# Patient Record
Sex: FEMALE | Race: WHITE | NOT HISPANIC OR LATINO
[De-identification: names, ages, dates, MRNs, and addresses within clinical notes are randomized per-mention and may not be internally consistent; named-entity substitution may affect disease eponyms.]

---

## 2018-04-16 ENCOUNTER — APPOINTMENT (OUTPATIENT)
Dept: NEUROSURGERY | Facility: CLINIC | Age: 53
End: 2018-04-16
Payer: COMMERCIAL

## 2018-04-16 PROCEDURE — 99203 OFFICE O/P NEW LOW 30 MIN: CPT

## 2018-10-31 ENCOUNTER — RESULT REVIEW (OUTPATIENT)
Age: 53
End: 2018-10-31

## 2019-03-30 ENCOUNTER — EMERGENCY (EMERGENCY)
Facility: HOSPITAL | Age: 54
LOS: 0 days | Discharge: HOME | End: 2019-03-30
Attending: EMERGENCY MEDICINE | Admitting: EMERGENCY MEDICINE

## 2019-03-30 VITALS
HEART RATE: 77 BPM | RESPIRATION RATE: 20 BRPM | SYSTOLIC BLOOD PRESSURE: 161 MMHG | DIASTOLIC BLOOD PRESSURE: 75 MMHG | OXYGEN SATURATION: 99 % | TEMPERATURE: 96 F

## 2019-03-30 VITALS
SYSTOLIC BLOOD PRESSURE: 159 MMHG | TEMPERATURE: 97 F | OXYGEN SATURATION: 99 % | DIASTOLIC BLOOD PRESSURE: 72 MMHG | HEART RATE: 76 BPM | RESPIRATION RATE: 20 BRPM

## 2019-03-30 DIAGNOSIS — R07.81 PLEURODYNIA: ICD-10-CM

## 2019-03-30 DIAGNOSIS — R07.89 OTHER CHEST PAIN: ICD-10-CM

## 2019-03-30 DIAGNOSIS — Z87.442 PERSONAL HISTORY OF URINARY CALCULI: ICD-10-CM

## 2019-03-30 LAB
ALBUMIN SERPL ELPH-MCNC: 4.4 G/DL — SIGNIFICANT CHANGE UP (ref 3.5–5.2)
ALP SERPL-CCNC: 95 U/L — SIGNIFICANT CHANGE UP (ref 30–115)
ALT FLD-CCNC: 18 U/L — SIGNIFICANT CHANGE UP (ref 0–41)
ANION GAP SERPL CALC-SCNC: 14 MMOL/L — SIGNIFICANT CHANGE UP (ref 7–14)
AST SERPL-CCNC: 21 U/L — SIGNIFICANT CHANGE UP (ref 0–41)
BASOPHILS # BLD AUTO: 0.04 K/UL — SIGNIFICANT CHANGE UP (ref 0–0.2)
BASOPHILS NFR BLD AUTO: 0.4 % — SIGNIFICANT CHANGE UP (ref 0–1)
BILIRUB SERPL-MCNC: 0.2 MG/DL — SIGNIFICANT CHANGE UP (ref 0.2–1.2)
BUN SERPL-MCNC: 14 MG/DL — SIGNIFICANT CHANGE UP (ref 10–20)
CALCIUM SERPL-MCNC: 10.1 MG/DL — SIGNIFICANT CHANGE UP (ref 8.5–10.1)
CHLORIDE SERPL-SCNC: 105 MMOL/L — SIGNIFICANT CHANGE UP (ref 98–110)
CK SERPL-CCNC: 74 U/L — SIGNIFICANT CHANGE UP (ref 0–225)
CO2 SERPL-SCNC: 23 MMOL/L — SIGNIFICANT CHANGE UP (ref 17–32)
CREAT SERPL-MCNC: 0.6 MG/DL — LOW (ref 0.7–1.5)
D DIMER BLD IA.RAPID-MCNC: 166 NG/ML DDU — SIGNIFICANT CHANGE UP (ref 0–230)
EOSINOPHIL # BLD AUTO: 0.13 K/UL — SIGNIFICANT CHANGE UP (ref 0–0.7)
EOSINOPHIL NFR BLD AUTO: 1.3 % — SIGNIFICANT CHANGE UP (ref 0–8)
GLUCOSE SERPL-MCNC: 104 MG/DL — HIGH (ref 70–99)
HCT VFR BLD CALC: 39.6 % — SIGNIFICANT CHANGE UP (ref 37–47)
HGB BLD-MCNC: 12.4 G/DL — SIGNIFICANT CHANGE UP (ref 12–16)
IMM GRANULOCYTES NFR BLD AUTO: 0.2 % — SIGNIFICANT CHANGE UP (ref 0.1–0.3)
LYMPHOCYTES # BLD AUTO: 3.33 K/UL — SIGNIFICANT CHANGE UP (ref 1.2–3.4)
LYMPHOCYTES # BLD AUTO: 33.8 % — SIGNIFICANT CHANGE UP (ref 20.5–51.1)
MCHC RBC-ENTMCNC: 25.2 PG — LOW (ref 27–31)
MCHC RBC-ENTMCNC: 31.3 G/DL — LOW (ref 32–37)
MCV RBC AUTO: 80.3 FL — LOW (ref 81–99)
MONOCYTES # BLD AUTO: 1.04 K/UL — HIGH (ref 0.1–0.6)
MONOCYTES NFR BLD AUTO: 10.5 % — HIGH (ref 1.7–9.3)
NEUTROPHILS # BLD AUTO: 5.3 K/UL — SIGNIFICANT CHANGE UP (ref 1.4–6.5)
NEUTROPHILS NFR BLD AUTO: 53.8 % — SIGNIFICANT CHANGE UP (ref 42.2–75.2)
NRBC # BLD: 0 /100 WBCS — SIGNIFICANT CHANGE UP (ref 0–0)
PLATELET # BLD AUTO: 216 K/UL — SIGNIFICANT CHANGE UP (ref 130–400)
POTASSIUM SERPL-MCNC: 4.1 MMOL/L — SIGNIFICANT CHANGE UP (ref 3.5–5)
POTASSIUM SERPL-SCNC: 4.1 MMOL/L — SIGNIFICANT CHANGE UP (ref 3.5–5)
PROT SERPL-MCNC: 7.1 G/DL — SIGNIFICANT CHANGE UP (ref 6–8)
RBC # BLD: 4.93 M/UL — SIGNIFICANT CHANGE UP (ref 4.2–5.4)
RBC # FLD: 16 % — HIGH (ref 11.5–14.5)
SODIUM SERPL-SCNC: 142 MMOL/L — SIGNIFICANT CHANGE UP (ref 135–146)
TROPONIN T SERPL-MCNC: <0.01 NG/ML — SIGNIFICANT CHANGE UP
WBC # BLD: 9.86 K/UL — SIGNIFICANT CHANGE UP (ref 4.8–10.8)
WBC # FLD AUTO: 9.86 K/UL — SIGNIFICANT CHANGE UP (ref 4.8–10.8)

## 2019-03-30 RX ORDER — ASPIRIN/CALCIUM CARB/MAGNESIUM 324 MG
325 TABLET ORAL ONCE
Qty: 0 | Refills: 0 | Status: COMPLETED | OUTPATIENT
Start: 2019-03-30 | End: 2019-03-30

## 2019-03-30 RX ADMIN — Medication 325 MILLIGRAM(S): at 20:20

## 2019-03-30 NOTE — ED PROVIDER NOTE - PROGRESS NOTE DETAILS
labs and imaging discussed with pt, recommend obs for r/o acs.  Pt notes she had negative exercise stress test one month ago. D/w pt that with active chest pain need further workup, she will d/w with her  regarding staying vs. ama pt notes she wants to sign out ama, aware of risks of leaving including worsening morbidity and mortality including death.   at bedside.  Labs printed and given to pt. To follow up with Luis on Monday, aware to return to ED for any concerns or if she changes her mind

## 2019-03-30 NOTE — ED ADULT TRIAGE NOTE - CHIEF COMPLAINT QUOTE
I woke up this morning with pain here (right anterior thorax) and now the pain is going up (neck) - patient

## 2019-03-30 NOTE — ED PROVIDER NOTE - OBJECTIVE STATEMENT
52 yo female h/o Lupus, on cellcept, h/o renal colic with lithotripsy one month ago complicated by abdominal hematoma, p/w right sided chest pain since this am. Notes that she awoke with the pain to right chest and then at dinner time radiated to right neck. Constant and pressure like.  +pleuritic. Alleviated a bit by tylenol. Pt denies trauma. Denies similar symptoms in past. No fhx of early cardiac disease.  Denies leg pain or swelling.  No recent sick contacts or travel.  PMD is Luis.

## 2019-03-30 NOTE — ED PROVIDER NOTE - NS ED ROS FT
Review of Systems    Constitutional: (-) fever  Cardiovascular: , (-) syncope  Respiratory: (-) cough, (-) shortness of breath  Gastrointestinal: (-) vomiting, (-) diarrhea, (-) abdominal pain  Musculoskeletal:  (-) back pain, (-) joint pain  Integumentary: (-) rash, (-) edema  Neurological: (-) headache, (-) altered mental status    Except as documented in the HPI, all other systems are negative.

## 2019-03-30 NOTE — ED PROVIDER NOTE - CLINICAL SUMMARY MEDICAL DECISION MAKING FREE TEXT BOX
52 yo female with sle p/w right sided cp to right neck. EKG noted. Labs noted. D/w pt and  obs for further eval, treatment, pt wants to leave ama, risks discussed, pt to follow up, return if changes her mind.

## 2019-03-30 NOTE — ED ADULT NURSE NOTE - OBJECTIVE STATEMENT
i'm having right side chest pains when I take a deep breath.  I think it might be my lupus but I'm not sure

## 2019-03-30 NOTE — ED PROVIDER NOTE - CARE PROVIDER_API CALL
Alonzo Solis)  Family Medicine  [address] [place] [address]  Phone: [phone]  Fax: [phone]  Follow Up Time: 1-3 Days

## 2019-03-30 NOTE — ED PROVIDER NOTE - NSFOLLOWUPINSTRUCTIONS_ED_ALL_ED_FT
you are having chest pain. We did not get to finish your evaluation and treatment as you are leaving AMA. Please follow up with your doctor in 24 hours, return for any concerns or if you change your mind.

## 2019-05-09 PROBLEM — M32.9 SYSTEMIC LUPUS ERYTHEMATOSUS, UNSPECIFIED: Chronic | Status: ACTIVE | Noted: 2019-03-30

## 2019-05-28 ENCOUNTER — OUTPATIENT (OUTPATIENT)
Dept: OUTPATIENT SERVICES | Facility: HOSPITAL | Age: 54
LOS: 1 days | Discharge: HOME | End: 2019-05-28

## 2019-05-28 VITALS
TEMPERATURE: 98 F | OXYGEN SATURATION: 99 % | WEIGHT: 160.94 LBS | HEIGHT: 62 IN | DIASTOLIC BLOOD PRESSURE: 84 MMHG | SYSTOLIC BLOOD PRESSURE: 144 MMHG | HEART RATE: 63 BPM | RESPIRATION RATE: 18 BRPM

## 2019-05-28 VITALS — HEART RATE: 60 BPM | RESPIRATION RATE: 18 BRPM | SYSTOLIC BLOOD PRESSURE: 150 MMHG | DIASTOLIC BLOOD PRESSURE: 70 MMHG

## 2019-05-28 DIAGNOSIS — J38.1 POLYP OF VOCAL CORD AND LARYNX: Chronic | ICD-10-CM

## 2019-05-28 DIAGNOSIS — Z98.890 OTHER SPECIFIED POSTPROCEDURAL STATES: Chronic | ICD-10-CM

## 2019-06-01 DIAGNOSIS — I73.00 RAYNAUD'S SYNDROME WITHOUT GANGRENE: ICD-10-CM

## 2019-06-01 DIAGNOSIS — M19.90 UNSPECIFIED OSTEOARTHRITIS, UNSPECIFIED SITE: ICD-10-CM

## 2019-06-01 DIAGNOSIS — H26.9 UNSPECIFIED CATARACT: ICD-10-CM

## 2019-06-01 DIAGNOSIS — L93.2 OTHER LOCAL LUPUS ERYTHEMATOSUS: ICD-10-CM

## 2019-06-01 DIAGNOSIS — N18.9 CHRONIC KIDNEY DISEASE, UNSPECIFIED: ICD-10-CM

## 2019-06-04 PROBLEM — I73.00 RAYNAUD'S SYNDROME WITHOUT GANGRENE: Chronic | Status: ACTIVE | Noted: 2019-05-28

## 2019-06-04 PROBLEM — N18.9 CHRONIC KIDNEY DISEASE, UNSPECIFIED: Chronic | Status: ACTIVE | Noted: 2019-05-28

## 2019-06-18 ENCOUNTER — OUTPATIENT (OUTPATIENT)
Dept: OUTPATIENT SERVICES | Facility: HOSPITAL | Age: 54
LOS: 1 days | Discharge: HOME | End: 2019-06-18

## 2019-06-18 VITALS — SYSTOLIC BLOOD PRESSURE: 165 MMHG | RESPIRATION RATE: 15 BRPM | DIASTOLIC BLOOD PRESSURE: 76 MMHG | HEART RATE: 68 BPM

## 2019-06-18 VITALS
HEIGHT: 64 IN | SYSTOLIC BLOOD PRESSURE: 175 MMHG | HEART RATE: 64 BPM | DIASTOLIC BLOOD PRESSURE: 79 MMHG | WEIGHT: 154.98 LBS | TEMPERATURE: 97 F | RESPIRATION RATE: 17 BRPM | OXYGEN SATURATION: 99 %

## 2019-06-18 DIAGNOSIS — J38.1 POLYP OF VOCAL CORD AND LARYNX: Chronic | ICD-10-CM

## 2019-06-18 DIAGNOSIS — Z98.890 OTHER SPECIFIED POSTPROCEDURAL STATES: Chronic | ICD-10-CM

## 2019-06-18 RX ORDER — ONDANSETRON 8 MG/1
4 TABLET, FILM COATED ORAL ONCE
Refills: 0 | Status: DISCONTINUED | OUTPATIENT
Start: 2019-06-18 | End: 2019-07-03

## 2019-06-18 RX ORDER — SODIUM CHLORIDE 9 MG/ML
1000 INJECTION, SOLUTION INTRAVENOUS
Refills: 0 | Status: DISCONTINUED | OUTPATIENT
Start: 2019-06-18 | End: 2019-07-03

## 2019-06-18 RX ORDER — ACETAMINOPHEN 500 MG
650 TABLET ORAL ONCE
Refills: 0 | Status: DISCONTINUED | OUTPATIENT
Start: 2019-06-18 | End: 2019-07-03

## 2019-06-18 NOTE — PACU DISCHARGE NOTE - COMMENTS
53 y o female S/P Right ECCE with IOL Implant, LSB/MAC without complications. VS /84 HR 65 RR 16 SaO2 99%. Pt tolerated procedure well.

## 2019-06-21 DIAGNOSIS — M32.9 SYSTEMIC LUPUS ERYTHEMATOSUS, UNSPECIFIED: ICD-10-CM

## 2019-06-21 DIAGNOSIS — H25.11 AGE-RELATED NUCLEAR CATARACT, RIGHT EYE: ICD-10-CM

## 2019-06-21 DIAGNOSIS — I73.00 RAYNAUD'S SYNDROME WITHOUT GANGRENE: ICD-10-CM

## 2019-06-21 DIAGNOSIS — M19.90 UNSPECIFIED OSTEOARTHRITIS, UNSPECIFIED SITE: ICD-10-CM

## 2019-06-21 DIAGNOSIS — Z87.442 PERSONAL HISTORY OF URINARY CALCULI: ICD-10-CM

## 2020-02-05 ENCOUNTER — RESULT REVIEW (OUTPATIENT)
Age: 55
End: 2020-02-05

## 2020-12-10 ENCOUNTER — APPOINTMENT (OUTPATIENT)
Dept: UROLOGY | Facility: CLINIC | Age: 55
End: 2020-12-10
Payer: COMMERCIAL

## 2020-12-10 PROCEDURE — 99203 OFFICE O/P NEW LOW 30 MIN: CPT

## 2020-12-10 PROCEDURE — 99072 ADDL SUPL MATRL&STAF TM PHE: CPT

## 2020-12-22 ENCOUNTER — OUTPATIENT (OUTPATIENT)
Dept: OUTPATIENT SERVICES | Facility: HOSPITAL | Age: 55
LOS: 1 days | Discharge: HOME | End: 2020-12-22
Payer: MEDICARE

## 2020-12-22 ENCOUNTER — RESULT REVIEW (OUTPATIENT)
Age: 55
End: 2020-12-22

## 2020-12-22 VITALS
HEART RATE: 65 BPM | RESPIRATION RATE: 18 BRPM | OXYGEN SATURATION: 97 % | HEIGHT: 64 IN | TEMPERATURE: 97 F | DIASTOLIC BLOOD PRESSURE: 92 MMHG | SYSTOLIC BLOOD PRESSURE: 160 MMHG | WEIGHT: 154.1 LBS

## 2020-12-22 DIAGNOSIS — Z01.818 ENCOUNTER FOR OTHER PREPROCEDURAL EXAMINATION: ICD-10-CM

## 2020-12-22 DIAGNOSIS — Z98.890 OTHER SPECIFIED POSTPROCEDURAL STATES: Chronic | ICD-10-CM

## 2020-12-22 DIAGNOSIS — N20.0 CALCULUS OF KIDNEY: ICD-10-CM

## 2020-12-22 DIAGNOSIS — J38.1 POLYP OF VOCAL CORD AND LARYNX: Chronic | ICD-10-CM

## 2020-12-22 LAB
ALBUMIN SERPL ELPH-MCNC: 4.6 G/DL — SIGNIFICANT CHANGE UP (ref 3.5–5.2)
ALP SERPL-CCNC: 77 U/L — SIGNIFICANT CHANGE UP (ref 30–115)
ALT FLD-CCNC: 16 U/L — SIGNIFICANT CHANGE UP (ref 0–41)
ANION GAP SERPL CALC-SCNC: 10 MMOL/L — SIGNIFICANT CHANGE UP (ref 7–14)
APPEARANCE UR: CLEAR — SIGNIFICANT CHANGE UP
APTT BLD: 33.8 SEC — SIGNIFICANT CHANGE UP (ref 27–39.2)
AST SERPL-CCNC: 23 U/L — SIGNIFICANT CHANGE UP (ref 0–41)
BASOPHILS # BLD AUTO: 0.04 K/UL — SIGNIFICANT CHANGE UP (ref 0–0.2)
BASOPHILS NFR BLD AUTO: 0.5 % — SIGNIFICANT CHANGE UP (ref 0–1)
BILIRUB SERPL-MCNC: 0.3 MG/DL — SIGNIFICANT CHANGE UP (ref 0.2–1.2)
BILIRUB UR-MCNC: NEGATIVE — SIGNIFICANT CHANGE UP
BUN SERPL-MCNC: 14 MG/DL — SIGNIFICANT CHANGE UP (ref 10–20)
CALCIUM SERPL-MCNC: 9.9 MG/DL — SIGNIFICANT CHANGE UP (ref 8.5–10.1)
CHLORIDE SERPL-SCNC: 104 MMOL/L — SIGNIFICANT CHANGE UP (ref 98–110)
CO2 SERPL-SCNC: 26 MMOL/L — SIGNIFICANT CHANGE UP (ref 17–32)
COLOR SPEC: SIGNIFICANT CHANGE UP
CREAT SERPL-MCNC: 1.1 MG/DL — SIGNIFICANT CHANGE UP (ref 0.7–1.5)
DIFF PNL FLD: NEGATIVE — SIGNIFICANT CHANGE UP
EOSINOPHIL # BLD AUTO: 0.12 K/UL — SIGNIFICANT CHANGE UP (ref 0–0.7)
EOSINOPHIL NFR BLD AUTO: 1.6 % — SIGNIFICANT CHANGE UP (ref 0–8)
GLUCOSE SERPL-MCNC: 92 MG/DL — SIGNIFICANT CHANGE UP (ref 70–99)
GLUCOSE UR QL: NEGATIVE — SIGNIFICANT CHANGE UP
HCT VFR BLD CALC: 44.6 % — SIGNIFICANT CHANGE UP (ref 37–47)
HGB BLD-MCNC: 14.4 G/DL — SIGNIFICANT CHANGE UP (ref 12–16)
IMM GRANULOCYTES NFR BLD AUTO: 0.3 % — SIGNIFICANT CHANGE UP (ref 0.1–0.3)
INR BLD: 0.95 RATIO — SIGNIFICANT CHANGE UP (ref 0.65–1.3)
KETONES UR-MCNC: NEGATIVE — SIGNIFICANT CHANGE UP
LEUKOCYTE ESTERASE UR-ACNC: NEGATIVE — SIGNIFICANT CHANGE UP
LYMPHOCYTES # BLD AUTO: 2.94 K/UL — SIGNIFICANT CHANGE UP (ref 1.2–3.4)
LYMPHOCYTES # BLD AUTO: 38.1 % — SIGNIFICANT CHANGE UP (ref 20.5–51.1)
MCHC RBC-ENTMCNC: 27.9 PG — SIGNIFICANT CHANGE UP (ref 27–31)
MCHC RBC-ENTMCNC: 32.3 G/DL — SIGNIFICANT CHANGE UP (ref 32–37)
MCV RBC AUTO: 86.3 FL — SIGNIFICANT CHANGE UP (ref 81–99)
MONOCYTES # BLD AUTO: 0.65 K/UL — HIGH (ref 0.1–0.6)
MONOCYTES NFR BLD AUTO: 8.4 % — SIGNIFICANT CHANGE UP (ref 1.7–9.3)
NEUTROPHILS # BLD AUTO: 3.95 K/UL — SIGNIFICANT CHANGE UP (ref 1.4–6.5)
NEUTROPHILS NFR BLD AUTO: 51.1 % — SIGNIFICANT CHANGE UP (ref 42.2–75.2)
NITRITE UR-MCNC: NEGATIVE — SIGNIFICANT CHANGE UP
NRBC # BLD: 0 /100 WBCS — SIGNIFICANT CHANGE UP (ref 0–0)
PH UR: 7 — SIGNIFICANT CHANGE UP (ref 5–8)
PLATELET # BLD AUTO: 221 K/UL — SIGNIFICANT CHANGE UP (ref 130–400)
POTASSIUM SERPL-MCNC: 4.5 MMOL/L — SIGNIFICANT CHANGE UP (ref 3.5–5)
POTASSIUM SERPL-SCNC: 4.5 MMOL/L — SIGNIFICANT CHANGE UP (ref 3.5–5)
PROT SERPL-MCNC: 7.1 G/DL — SIGNIFICANT CHANGE UP (ref 6–8)
PROT UR-MCNC: SIGNIFICANT CHANGE UP
PROTHROM AB SERPL-ACNC: 10.9 SEC — SIGNIFICANT CHANGE UP (ref 9.95–12.87)
RBC # BLD: 5.17 M/UL — SIGNIFICANT CHANGE UP (ref 4.2–5.4)
RBC # FLD: 13.2 % — SIGNIFICANT CHANGE UP (ref 11.5–14.5)
SODIUM SERPL-SCNC: 140 MMOL/L — SIGNIFICANT CHANGE UP (ref 135–146)
SP GR SPEC: 1.01 — SIGNIFICANT CHANGE UP (ref 1.01–1.03)
UROBILINOGEN FLD QL: SIGNIFICANT CHANGE UP
WBC # BLD: 7.72 K/UL — SIGNIFICANT CHANGE UP (ref 4.8–10.8)
WBC # FLD AUTO: 7.72 K/UL — SIGNIFICANT CHANGE UP (ref 4.8–10.8)

## 2020-12-22 PROCEDURE — 72050 X-RAY EXAM NECK SPINE 4/5VWS: CPT | Mod: 26

## 2020-12-22 PROCEDURE — 93010 ELECTROCARDIOGRAM REPORT: CPT

## 2020-12-22 NOTE — H&P PST ADULT - NSICDXPASTSURGICALHX_GEN_ALL_CORE_FT
PAST SURGICAL HISTORY:  H/O lithotripsy eswal    History of D&C     History of surgery renal biopsy    Laryngeal polyp

## 2020-12-22 NOTE — H&P PST ADULT - HISTORY OF PRESENT ILLNESS
hx stones since christmas 2017  noted to have large stone on left side ureter   now for planned procedure     PATIENT CURRENTLY DENIES CHEST PAIN  SHORTNESS OF BREATH  PALPITATIONS,  DYSURIA, OR UPPER RESPIRATORY INFECTION IN PAST 2 WEEKS  EXERCISE  TOLERANCE  1-2 FLIGHT OF STAIRS  WITHOUT SHORTNESS OF BREATH  denies travel outside the [place] in the past 30 days  pt denies any covid s/s, or tested positive in the past  pt advised self quarantine till day of procedure  Anesthesia Alert  NO--Difficult Airway  NO--History of neck surgery or radiation  NO--Limited ROM of neck  NO--History of Malignant hyperthermia  NO--No personal or family history of Pseudocholinesterase deficiency.  Prior Anesthesia Complication  hypotension   NO--Latex Allergy  NO--Loose teeth  + Rheumatoid Arthritis  NO--CLAUDIA  + Other_____  Raynaud's disease     Encounter for other preprocedural examination    Calculus of kidney    ^N20.0/ 18669/ 95038                                                           1/13/21 [facility]    Encounter for other preprocedural examination    Calculus of kidney    Raynauds disease    Chronic kidney disease (CKD)    Arthritis    Lupus    History of surgery    Laryngeal polyp    H/O lithotripsy    History of D&amp;C

## 2020-12-22 NOTE — H&P PST ADULT - NSICDXPASTMEDICALHX_GEN_ALL_CORE_FT
PAST MEDICAL HISTORY:  Arthritis     Chronic kidney disease (CKD)     Lupus     Raynauds disease     Rheumatoid arthritis

## 2020-12-23 LAB
CULTURE RESULTS: SIGNIFICANT CHANGE UP
SPECIMEN SOURCE: SIGNIFICANT CHANGE UP

## 2021-01-10 ENCOUNTER — LABORATORY RESULT (OUTPATIENT)
Age: 56
End: 2021-01-10

## 2021-01-10 ENCOUNTER — OUTPATIENT (OUTPATIENT)
Dept: OUTPATIENT SERVICES | Facility: HOSPITAL | Age: 56
LOS: 1 days | Discharge: HOME | End: 2021-01-10

## 2021-01-10 DIAGNOSIS — Z11.59 ENCOUNTER FOR SCREENING FOR OTHER VIRAL DISEASES: ICD-10-CM

## 2021-01-10 DIAGNOSIS — Z98.890 OTHER SPECIFIED POSTPROCEDURAL STATES: Chronic | ICD-10-CM

## 2021-01-10 DIAGNOSIS — J38.1 POLYP OF VOCAL CORD AND LARYNX: Chronic | ICD-10-CM

## 2021-01-10 PROBLEM — M06.9 RHEUMATOID ARTHRITIS, UNSPECIFIED: Chronic | Status: ACTIVE | Noted: 2020-12-22

## 2021-01-12 NOTE — ASU PATIENT PROFILE, ADULT - PSH
H/O lithotripsy  eswal  History of D&C    History of surgery  B/L IOL  History of surgery  renal biopsy  Laryngeal polyp

## 2021-01-12 NOTE — ASU PATIENT PROFILE, ADULT - REASON FOR ADMISSION, PROFILE
Spoke to Barrett.  His eye is doing better, has no discomfort .  On occasion , his eye will feel tired.  Told him to call if he had any concerns.   
LEFT SIDE KIDNEY STONE REMOVAL

## 2021-01-13 ENCOUNTER — APPOINTMENT (OUTPATIENT)
Dept: UROLOGY | Facility: HOSPITAL | Age: 56
End: 2021-01-13
Payer: COMMERCIAL

## 2021-01-13 ENCOUNTER — OUTPATIENT (OUTPATIENT)
Dept: OUTPATIENT SERVICES | Facility: HOSPITAL | Age: 56
LOS: 1 days | Discharge: HOME | End: 2021-01-13

## 2021-01-13 ENCOUNTER — EMERGENCY (EMERGENCY)
Facility: HOSPITAL | Age: 56
LOS: 0 days | Discharge: HOME | End: 2021-01-13
Attending: EMERGENCY MEDICINE | Admitting: EMERGENCY MEDICINE
Payer: MEDICARE

## 2021-01-13 VITALS
RESPIRATION RATE: 16 BRPM | DIASTOLIC BLOOD PRESSURE: 82 MMHG | TEMPERATURE: 97 F | HEART RATE: 91 BPM | SYSTOLIC BLOOD PRESSURE: 155 MMHG | OXYGEN SATURATION: 98 % | HEIGHT: 64 IN

## 2021-01-13 VITALS
HEART RATE: 96 BPM | OXYGEN SATURATION: 100 % | DIASTOLIC BLOOD PRESSURE: 95 MMHG | SYSTOLIC BLOOD PRESSURE: 191 MMHG | RESPIRATION RATE: 22 BRPM

## 2021-01-13 VITALS
WEIGHT: 154.98 LBS | RESPIRATION RATE: 16 BRPM | HEART RATE: 70 BPM | TEMPERATURE: 96 F | OXYGEN SATURATION: 100 % | SYSTOLIC BLOOD PRESSURE: 187 MMHG | HEIGHT: 64 IN | DIASTOLIC BLOOD PRESSURE: 84 MMHG

## 2021-01-13 VITALS
DIASTOLIC BLOOD PRESSURE: 83 MMHG | SYSTOLIC BLOOD PRESSURE: 157 MMHG | HEART RATE: 86 BPM | OXYGEN SATURATION: 97 % | RESPIRATION RATE: 18 BRPM

## 2021-01-13 DIAGNOSIS — J38.1 POLYP OF VOCAL CORD AND LARYNX: Chronic | ICD-10-CM

## 2021-01-13 DIAGNOSIS — Z98.890 OTHER SPECIFIED POSTPROCEDURAL STATES: Chronic | ICD-10-CM

## 2021-01-13 DIAGNOSIS — I10 ESSENTIAL (PRIMARY) HYPERTENSION: ICD-10-CM

## 2021-01-13 DIAGNOSIS — N18.9 CHRONIC KIDNEY DISEASE, UNSPECIFIED: ICD-10-CM

## 2021-01-13 DIAGNOSIS — I12.9 HYPERTENSIVE CHRONIC KIDNEY DISEASE WITH STAGE 1 THROUGH STAGE 4 CHRONIC KIDNEY DISEASE, OR UNSPECIFIED CHRONIC KIDNEY DISEASE: ICD-10-CM

## 2021-01-13 DIAGNOSIS — Z98.890 OTHER SPECIFIED POSTPROCEDURAL STATES: ICD-10-CM

## 2021-01-13 PROCEDURE — 99284 EMERGENCY DEPT VISIT MOD MDM: CPT

## 2021-01-13 PROCEDURE — 52356 CYSTO/URETERO W/LITHOTRIPSY: CPT | Mod: LT

## 2021-01-13 RX ORDER — ACETAMINOPHEN 500 MG
975 TABLET ORAL ONCE
Refills: 0 | Status: COMPLETED | OUTPATIENT
Start: 2021-01-13 | End: 2021-01-13

## 2021-01-13 RX ORDER — HYDRALAZINE HCL 50 MG
10 TABLET ORAL
Refills: 0 | Status: COMPLETED | OUTPATIENT
Start: 2021-01-13 | End: 2021-01-13

## 2021-01-13 RX ORDER — SODIUM CHLORIDE 9 MG/ML
1000 INJECTION, SOLUTION INTRAVENOUS
Refills: 0 | Status: DISCONTINUED | OUTPATIENT
Start: 2021-01-13 | End: 2021-01-13

## 2021-01-13 RX ORDER — ONDANSETRON 8 MG/1
4 TABLET, FILM COATED ORAL ONCE
Refills: 0 | Status: DISCONTINUED | OUTPATIENT
Start: 2021-01-13 | End: 2021-01-13

## 2021-01-13 RX ORDER — HYDROMORPHONE HYDROCHLORIDE 2 MG/ML
0.5 INJECTION INTRAMUSCULAR; INTRAVENOUS; SUBCUTANEOUS
Refills: 0 | Status: DISCONTINUED | OUTPATIENT
Start: 2021-01-13 | End: 2021-01-13

## 2021-01-13 RX ADMIN — Medication 10 MILLIGRAM(S): at 12:38

## 2021-01-13 RX ADMIN — Medication 10 MILLIGRAM(S): at 12:28

## 2021-01-13 RX ADMIN — Medication 975 MILLIGRAM(S): at 13:57

## 2021-01-13 NOTE — ED PROVIDER NOTE - PATIENT PORTAL LINK FT
You can access the FollowMyHealth Patient Portal offered by [facility] by registering at the following website: [email or web address]. By joining [employer]’s FollowMyHealth portal, you will also be able to view your health information using other applications (apps) compatible with our system.

## 2021-01-13 NOTE — CHART NOTE - NSCHARTNOTEFT_GEN_A_CORE
Patient noted to be having elevated Blood Pressures in the PACU. 223/109  206/106 197/108 given Hydralazine 10 mgs IV x2. Will transfer to ER for further evaluation and management. Discussed with ER Physician Dr. Reyes.

## 2021-01-13 NOTE — CHART NOTE - NSCHARTNOTEFT_GEN_A_CORE
PACU ANESTHESIA ADMISSION NOTE      Procedure: cystoscopy laser lithotripsy, left stent placement  Post op diagnosis:  left ureteral stone: hydronephrosis    ____  Intubated  TV:______       Rate: ______      FiO2: ______    __x__  Patent Airway    _x___  Full return of protective reflexes    __x__  Full recovery from anesthesia / back to baseline     Vitals:   T:     97.5      R:   12               BP:     189/92             Sat:     99%              P: 71      Mental Status:  ____x Awake   _____ Alert   _____ Drowsy   _____ Sedated    Nausea/Vomiting:  ___x_ NO  ______Yes,   See Post - Op Orders          Pain Scale (0-10):  0_____    Treatment: _x__ None    ____ See Post - Op/PCA Orders    Post - Operative Fluids:   ____ Oral   ___x_ See Post - Op Orders    Plan: Discharge:   _x___Home       _____Floor     _____Critical Care    _____  Other:_________________    Comments:

## 2021-01-13 NOTE — BRIEF OPERATIVE NOTE - NSICDXBRIEFPROCEDURE_GEN_ALL_CORE_FT
PROCEDURES:  Insertion, stent, ureter 13-Jan-2021 11:35:16  Phan Robison  XR pyelogram retrograde using cystoscopy 13-Jan-2021 11:35:06  Phan Robison  Cystourethroscopy with left ureteroscopy with removal of calculus 13-Jan-2021 11:34:50  Phan Robison

## 2021-01-13 NOTE — ED PROVIDER NOTE - ATTENDING CONTRIBUTION TO CARE
54 yo F PMHx noted presents from recovery room for evaluation of elevated BP.  Pt s/p urologic procedure this morning.  BP was 200/113, given hYdralazine 10 mg x 2.  In ED she feels well, c/o some soreness to left side, no CP, no SOB.  Pt states that on her last few PCP visits she was told that her BP may need to be addressed. On exam pt appears well, Lungs cta b/l, speech is clear and fluent, no edema seen ambulate with steady gait

## 2021-01-13 NOTE — PACU DISCHARGE NOTE - COMMENTS
Discharge to ER for further evaluation and management of elevated Blood pressure. Discussed case with ER Physician Dr. Reyes.

## 2021-01-13 NOTE — ED ADULT TRIAGE NOTE - CHIEF COMPLAINT QUOTE
Pt arrives from recovery room. Pt had outpatient cystoscopy, left ureteroscopy , laser lithotripsy with left stent placement. Pt's preop blood pressure 187/84. Post op blood pressure 233/115. Pt given Hydralazine 10 mg X 2 doses ten minutes apart. Pt responded with blood pressure 191/95. Pt sent to ED for evaluation.

## 2021-01-13 NOTE — ED ADULT NURSE NOTE - OBJECTIVE STATEMENT
pt sent from recovery room for elevated blood pressure post-op, in ED pts BP 150s/80s, pt denies any pain discomfort or headaches

## 2021-01-13 NOTE — ED PROVIDER NOTE - CLINICAL SUMMARY MEDICAL DECISION MAKING FREE TEXT BOX
Pt BP in ED improved.  Pt has appt with PMD coming up.  will call to schedule sooner, Pt instructed to return if any worsening symptoms or concerns.  They verbalize understanding.

## 2021-01-13 NOTE — ED PROVIDER NOTE - OBJECTIVE STATEMENT
Patient sent from amb surg for HTN, Had cystoscopy to remove renal stone today, /113 post op, Given hydralazine 10mg IVP time 2  and sent to ED, no chest pain, no SOB, BP now normal in ED. Patient states she feels fine.

## 2021-01-13 NOTE — BRIEF OPERATIVE NOTE - NSICDXBRIEFPOSTOP_GEN_ALL_CORE_FT
POST-OP DIAGNOSIS:  Left ureteral stone 13-Jan-2021 11:36:35  Phan Robison  Hydronephrosis, left 13-Jan-2021 11:36:25  Phan Robison

## 2021-01-18 DIAGNOSIS — N13.2 HYDRONEPHROSIS WITH RENAL AND URETERAL CALCULOUS OBSTRUCTION: ICD-10-CM

## 2021-01-18 DIAGNOSIS — M32.9 SYSTEMIC LUPUS ERYTHEMATOSUS, UNSPECIFIED: ICD-10-CM

## 2021-01-18 DIAGNOSIS — Z79.82 LONG TERM (CURRENT) USE OF ASPIRIN: ICD-10-CM

## 2021-01-18 DIAGNOSIS — I73.00 RAYNAUD'S SYNDROME WITHOUT GANGRENE: ICD-10-CM

## 2021-01-18 DIAGNOSIS — N18.9 CHRONIC KIDNEY DISEASE, UNSPECIFIED: ICD-10-CM

## 2021-01-18 DIAGNOSIS — M19.90 UNSPECIFIED OSTEOARTHRITIS, UNSPECIFIED SITE: ICD-10-CM

## 2021-01-18 DIAGNOSIS — M06.9 RHEUMATOID ARTHRITIS, UNSPECIFIED: ICD-10-CM

## 2021-01-25 ENCOUNTER — APPOINTMENT (OUTPATIENT)
Dept: UROLOGY | Facility: CLINIC | Age: 56
End: 2021-01-25
Payer: COMMERCIAL

## 2021-01-25 VITALS — TEMPERATURE: 98.1 F

## 2021-01-25 PROCEDURE — 99072 ADDL SUPL MATRL&STAF TM PHE: CPT

## 2021-01-25 PROCEDURE — 99213 OFFICE O/P EST LOW 20 MIN: CPT

## 2021-02-17 ENCOUNTER — OUTPATIENT (OUTPATIENT)
Dept: OUTPATIENT SERVICES | Facility: HOSPITAL | Age: 56
LOS: 1 days | Discharge: HOME | End: 2021-02-17
Payer: MEDICARE

## 2021-02-17 VITALS
HEART RATE: 75 BPM | DIASTOLIC BLOOD PRESSURE: 88 MMHG | WEIGHT: 158.07 LBS | SYSTOLIC BLOOD PRESSURE: 164 MMHG | RESPIRATION RATE: 16 BRPM | TEMPERATURE: 97 F | HEIGHT: 64 IN | OXYGEN SATURATION: 99 %

## 2021-02-17 DIAGNOSIS — N20.1 CALCULUS OF URETER: ICD-10-CM

## 2021-02-17 DIAGNOSIS — J38.1 POLYP OF VOCAL CORD AND LARYNX: Chronic | ICD-10-CM

## 2021-02-17 DIAGNOSIS — Z01.818 ENCOUNTER FOR OTHER PREPROCEDURAL EXAMINATION: ICD-10-CM

## 2021-02-17 DIAGNOSIS — Z98.890 OTHER SPECIFIED POSTPROCEDURAL STATES: Chronic | ICD-10-CM

## 2021-02-17 LAB
ALBUMIN SERPL ELPH-MCNC: 4.4 G/DL — SIGNIFICANT CHANGE UP (ref 3.5–5.2)
ALP SERPL-CCNC: 97 U/L — SIGNIFICANT CHANGE UP (ref 30–115)
ALT FLD-CCNC: 14 U/L — SIGNIFICANT CHANGE UP (ref 0–41)
ANION GAP SERPL CALC-SCNC: 8 MMOL/L — SIGNIFICANT CHANGE UP (ref 7–14)
APPEARANCE UR: ABNORMAL
APTT BLD: 36.9 SEC — SIGNIFICANT CHANGE UP (ref 27–39.2)
AST SERPL-CCNC: 20 U/L — SIGNIFICANT CHANGE UP (ref 0–41)
BACTERIA # UR AUTO: NEGATIVE — SIGNIFICANT CHANGE UP
BASOPHILS # BLD AUTO: 0.04 K/UL — SIGNIFICANT CHANGE UP (ref 0–0.2)
BASOPHILS NFR BLD AUTO: 0.5 % — SIGNIFICANT CHANGE UP (ref 0–1)
BILIRUB SERPL-MCNC: 0.3 MG/DL — SIGNIFICANT CHANGE UP (ref 0.2–1.2)
BILIRUB UR-MCNC: NEGATIVE — SIGNIFICANT CHANGE UP
BUN SERPL-MCNC: 13 MG/DL — SIGNIFICANT CHANGE UP (ref 10–20)
CALCIUM SERPL-MCNC: 11.6 MG/DL — HIGH (ref 8.5–10.1)
CHLORIDE SERPL-SCNC: 104 MMOL/L — SIGNIFICANT CHANGE UP (ref 98–110)
CO2 SERPL-SCNC: 30 MMOL/L — SIGNIFICANT CHANGE UP (ref 17–32)
COLOR SPEC: ABNORMAL
CREAT SERPL-MCNC: 0.8 MG/DL — SIGNIFICANT CHANGE UP (ref 0.7–1.5)
DIFF PNL FLD: ABNORMAL
EOSINOPHIL # BLD AUTO: 0.17 K/UL — SIGNIFICANT CHANGE UP (ref 0–0.7)
EOSINOPHIL NFR BLD AUTO: 2.3 % — SIGNIFICANT CHANGE UP (ref 0–8)
EPI CELLS # UR: 4 /HPF — SIGNIFICANT CHANGE UP (ref 0–5)
GLUCOSE SERPL-MCNC: 87 MG/DL — SIGNIFICANT CHANGE UP (ref 70–99)
GLUCOSE UR QL: NEGATIVE — SIGNIFICANT CHANGE UP
HCT VFR BLD CALC: 43.5 % — SIGNIFICANT CHANGE UP (ref 37–47)
HGB BLD-MCNC: 14.1 G/DL — SIGNIFICANT CHANGE UP (ref 12–16)
HYALINE CASTS # UR AUTO: 0 /LPF — SIGNIFICANT CHANGE UP (ref 0–7)
IMM GRANULOCYTES NFR BLD AUTO: 0.3 % — SIGNIFICANT CHANGE UP (ref 0.1–0.3)
INR BLD: 0.92 RATIO — SIGNIFICANT CHANGE UP (ref 0.65–1.3)
KETONES UR-MCNC: NEGATIVE — SIGNIFICANT CHANGE UP
LEUKOCYTE ESTERASE UR-ACNC: ABNORMAL
LYMPHOCYTES # BLD AUTO: 2.8 K/UL — SIGNIFICANT CHANGE UP (ref 1.2–3.4)
LYMPHOCYTES # BLD AUTO: 38.1 % — SIGNIFICANT CHANGE UP (ref 20.5–51.1)
MCHC RBC-ENTMCNC: 27.3 PG — SIGNIFICANT CHANGE UP (ref 27–31)
MCHC RBC-ENTMCNC: 32.4 G/DL — SIGNIFICANT CHANGE UP (ref 32–37)
MCV RBC AUTO: 84.3 FL — SIGNIFICANT CHANGE UP (ref 81–99)
MONOCYTES # BLD AUTO: 0.63 K/UL — HIGH (ref 0.1–0.6)
MONOCYTES NFR BLD AUTO: 8.6 % — SIGNIFICANT CHANGE UP (ref 1.7–9.3)
NEUTROPHILS # BLD AUTO: 3.69 K/UL — SIGNIFICANT CHANGE UP (ref 1.4–6.5)
NEUTROPHILS NFR BLD AUTO: 50.2 % — SIGNIFICANT CHANGE UP (ref 42.2–75.2)
NITRITE UR-MCNC: NEGATIVE — SIGNIFICANT CHANGE UP
NRBC # BLD: 0 /100 WBCS — SIGNIFICANT CHANGE UP (ref 0–0)
PH UR: 7 — SIGNIFICANT CHANGE UP (ref 5–8)
PLATELET # BLD AUTO: 295 K/UL — SIGNIFICANT CHANGE UP (ref 130–400)
POTASSIUM SERPL-MCNC: 5.3 MMOL/L — HIGH (ref 3.5–5)
POTASSIUM SERPL-SCNC: 5.3 MMOL/L — HIGH (ref 3.5–5)
PROT SERPL-MCNC: 7.4 G/DL — SIGNIFICANT CHANGE UP (ref 6–8)
PROT UR-MCNC: ABNORMAL
PROTHROM AB SERPL-ACNC: 10.6 SEC — SIGNIFICANT CHANGE UP (ref 9.95–12.87)
RBC # BLD: 5.16 M/UL — SIGNIFICANT CHANGE UP (ref 4.2–5.4)
RBC # FLD: 12.8 % — SIGNIFICANT CHANGE UP (ref 11.5–14.5)
RBC CASTS # UR COMP ASSIST: >720 /HPF — HIGH (ref 0–4)
SODIUM SERPL-SCNC: 142 MMOL/L — SIGNIFICANT CHANGE UP (ref 135–146)
SP GR SPEC: 1.01 — SIGNIFICANT CHANGE UP (ref 1.01–1.03)
UROBILINOGEN FLD QL: SIGNIFICANT CHANGE UP
WBC # BLD: 7.35 K/UL — SIGNIFICANT CHANGE UP (ref 4.8–10.8)
WBC # FLD AUTO: 7.35 K/UL — SIGNIFICANT CHANGE UP (ref 4.8–10.8)
WBC UR QL: 16 /HPF — HIGH (ref 0–5)

## 2021-02-17 PROCEDURE — 93010 ELECTROCARDIOGRAM REPORT: CPT

## 2021-02-17 NOTE — H&P PST ADULT - HISTORY OF PRESENT ILLNESS
54 y/o female female presents to PAST for cystoscopy left ureteroscopy removal possible exchange of jj stent with Dr. Robison in [facility] under [place] on 3/10/21    Pt had a cystoscopy left ureteroscopy laser lithotripsy and placement of JJ stent on 1/13/21. Pt had followed up with Dr. Robison, that stated that she had scar tissue. It was recommended to have above procedure to remove stents and possibly replace them in the OR. PT denies any pain or discomfort with urination at this time. Pt has tubid urine due to stents in place.    PATIENT CURRENTLY DENIES CHEST PAIN  SHORTNESS OF BREATH  PALPITATIONS,  DYSURIA, OR UPPER RESPIRATORY INFECTION IN PAST 2 WEEKS  EXERCISE  TOLERANCE  1 FLIGHT OF STAIRS  WITHOUT SHORTNESS OF BREATH    den[place] travel outside the [place] in the past 30 days    pt denies any covid s/s, or tested positive in the past  pt advised self quarantine till day of procedure    Anesthesia Alert  NO--Difficult Airway  NO--History of neck surgery or radiation  NO--Limited ROM of neck  NO--History of Malignant hyperthermia  NO--No personal or family history of Pseudocholinesterase deficiency.  Yes--Prior Anesthesia Complication, hypotension  NO--Latex Allergy  No--Loose teeth  YEs--History of Rheumatoid Arthritis  NO--CLAUDIA  NO--Other_____    ^N20.1/ 43500/ 05904    3/10/21 [facility]    Rheumatoid arthritis    Raynauds disease    Chronic kidney disease (CKD)    Arthritis    Lupus    History of surgery    History of surgery    Laryngeal polyp    H/O lithotripsy    History of D&amp;C     56 y/o female female presents to PAST for cystoscopy left ureteroscopy removal possible exchange of jj stent with Dr. Robison in [facility] under [place] on 3/10/21    Pt had a cystoscopy left ureteroscopy laser lithotripsy and placement of JJ stent on 1/13/21. Pt had followed up with Dr. Robison, that stated that she had scar tissue due to longstanding kidney stones. It was recommended to have above procedure to remove stents and possibly replace them in the OR. PT denies any pain or discomfort with urination at this time. Pt has tubid urine due to stents in place.    PATIENT CURRENTLY DENIES CHEST PAIN  SHORTNESS OF BREATH  PALPITATIONS,  DYSURIA, OR UPPER RESPIRATORY INFECTION IN PAST 2 WEEKS  EXERCISE  TOLERANCE  1 FLIGHT OF STAIRS  WITHOUT SHORTNESS OF BREATH    denies travel outside the [place] in the past 30 days    pt denies any covid s/s, or tested positive in the past  pt advised self quarantine till day of procedure    Anesthesia Alert  NO--Difficult Airway  NO--History of neck surgery or radiation  NO--Limited ROM of neck  NO--History of Malignant hyperthermia  NO--No personal or family history of Pseudocholinesterase deficiency.  Yes--Prior Anesthesia Complication, hypotension  NO--Latex Allergy  No--Loose teeth  YEs--History of Rheumatoid Arthritis  NO--CLAUDIA  NO--Other_____    ^N20.1/ 64391/ 39915    3/10/21 [facility]    Rheumatoid arthritis    Raynauds disease    Chronic kidney disease (CKD)    Arthritis    Lupus    History of surgery    History of surgery    Laryngeal polyp    H/O lithotripsy    History of D&amp;C

## 2021-02-17 NOTE — H&P PST ADULT - NSICDXPASTSURGICALHX_GEN_ALL_CORE_FT
PAST SURGICAL HISTORY:  H/O lithotripsy eswal    History of D&C     History of surgery renal biopsy    History of surgery B/L IOL    Laryngeal polyp

## 2021-02-17 NOTE — H&P PST ADULT - REASON FOR ADMISSION
54 y/o female female presents to PAST for cystoscopy left ureteroscopy removal possible exchange of jj stent with Dr. Robison in [facility] under [place] on 3/10/21

## 2021-02-19 LAB
CULTURE RESULTS: SIGNIFICANT CHANGE UP
SPECIMEN SOURCE: SIGNIFICANT CHANGE UP

## 2021-03-07 ENCOUNTER — LABORATORY RESULT (OUTPATIENT)
Age: 56
End: 2021-03-07

## 2021-03-07 ENCOUNTER — OUTPATIENT (OUTPATIENT)
Dept: OUTPATIENT SERVICES | Facility: HOSPITAL | Age: 56
LOS: 1 days | Discharge: HOME | End: 2021-03-07

## 2021-03-07 DIAGNOSIS — Z98.890 OTHER SPECIFIED POSTPROCEDURAL STATES: Chronic | ICD-10-CM

## 2021-03-07 DIAGNOSIS — J38.1 POLYP OF VOCAL CORD AND LARYNX: Chronic | ICD-10-CM

## 2021-03-07 DIAGNOSIS — Z11.59 ENCOUNTER FOR SCREENING FOR OTHER VIRAL DISEASES: ICD-10-CM

## 2021-03-10 ENCOUNTER — OUTPATIENT (OUTPATIENT)
Dept: OUTPATIENT SERVICES | Facility: HOSPITAL | Age: 56
LOS: 1 days | Discharge: HOME | End: 2021-03-10

## 2021-03-10 ENCOUNTER — APPOINTMENT (OUTPATIENT)
Dept: UROLOGY | Facility: HOSPITAL | Age: 56
End: 2021-03-10
Payer: COMMERCIAL

## 2021-03-10 VITALS — SYSTOLIC BLOOD PRESSURE: 151 MMHG | DIASTOLIC BLOOD PRESSURE: 85 MMHG | RESPIRATION RATE: 15 BRPM | HEART RATE: 73 BPM

## 2021-03-10 VITALS
DIASTOLIC BLOOD PRESSURE: 89 MMHG | OXYGEN SATURATION: 100 % | RESPIRATION RATE: 17 BRPM | HEIGHT: 64 IN | TEMPERATURE: 97 F | WEIGHT: 158.07 LBS | SYSTOLIC BLOOD PRESSURE: 149 MMHG | HEART RATE: 70 BPM

## 2021-03-10 DIAGNOSIS — Z98.890 OTHER SPECIFIED POSTPROCEDURAL STATES: Chronic | ICD-10-CM

## 2021-03-10 DIAGNOSIS — J38.1 POLYP OF VOCAL CORD AND LARYNX: Chronic | ICD-10-CM

## 2021-03-10 PROCEDURE — 52351 CYSTOURETERO & OR PYELOSCOPE: CPT | Mod: LT

## 2021-03-10 PROCEDURE — 50684 INJECTION FOR URETER X-RAY: CPT | Mod: LT

## 2021-03-10 RX ORDER — SODIUM CHLORIDE 9 MG/ML
1000 INJECTION, SOLUTION INTRAVENOUS
Refills: 0 | Status: DISCONTINUED | OUTPATIENT
Start: 2021-03-10 | End: 2021-03-24

## 2021-03-10 RX ORDER — OXYCODONE AND ACETAMINOPHEN 5; 325 MG/1; MG/1
1 TABLET ORAL EVERY 4 HOURS
Refills: 0 | Status: DISCONTINUED | OUTPATIENT
Start: 2021-03-10 | End: 2021-03-10

## 2021-03-10 RX ORDER — HYDROMORPHONE HYDROCHLORIDE 2 MG/ML
0.5 INJECTION INTRAMUSCULAR; INTRAVENOUS; SUBCUTANEOUS
Refills: 0 | Status: DISCONTINUED | OUTPATIENT
Start: 2021-03-10 | End: 2021-03-10

## 2021-03-10 RX ADMIN — SODIUM CHLORIDE 100 MILLILITER(S): 9 INJECTION, SOLUTION INTRAVENOUS at 08:22

## 2021-03-10 NOTE — ASU PATIENT PROFILE, ADULT - PSH
H/O colonoscopy  2018  H/O lithotripsy  eswal  History of D&C    History of surgery  B/L IOL  History of surgery  renal biopsy  Laryngeal polyp

## 2021-03-10 NOTE — ASU PATIENT PROFILE, ADULT - PMH
Arthritis    Cataracts, bilateral  REMOVED WITH IOLI  Chronic kidney disease (CKD)    HTN (hypertension)    Lupus    Raynauds disease    Renal calculi    Rheumatoid arthritis

## 2021-03-10 NOTE — BRIEF OPERATIVE NOTE - NSICDXBRIEFPOSTOP_GEN_ALL_CORE_FT
POST-OP DIAGNOSIS:  Left nephrolithiasis 10-Mar-2021 08:26:06  Phan Robison  Ureteral stricture, left 10-Mar-2021 08:25:49  Phan Robison

## 2021-03-10 NOTE — BRIEF OPERATIVE NOTE - NSICDXBRIEFPROCEDURE_GEN_ALL_CORE_FT
PROCEDURES:  Cystoscopy, with retrograde pyelogram 10-Mar-2021 08:24:51  Phan Robison  Diagnostic cystourethroscopy with ureteroscopy 10-Mar-2021 08:24:42  Phan Robison

## 2021-03-15 DIAGNOSIS — Z46.6 ENCOUNTER FOR FITTING AND ADJUSTMENT OF URINARY DEVICE: ICD-10-CM

## 2021-03-15 DIAGNOSIS — I12.9 HYPERTENSIVE CHRONIC KIDNEY DISEASE WITH STAGE 1 THROUGH STAGE 4 CHRONIC KIDNEY DISEASE, OR UNSPECIFIED CHRONIC KIDNEY DISEASE: ICD-10-CM

## 2021-03-15 DIAGNOSIS — M32.9 SYSTEMIC LUPUS ERYTHEMATOSUS, UNSPECIFIED: ICD-10-CM

## 2021-03-15 DIAGNOSIS — N18.9 CHRONIC KIDNEY DISEASE, UNSPECIFIED: ICD-10-CM

## 2021-03-15 DIAGNOSIS — M19.90 UNSPECIFIED OSTEOARTHRITIS, UNSPECIFIED SITE: ICD-10-CM

## 2021-03-15 DIAGNOSIS — Z79.82 LONG TERM (CURRENT) USE OF ASPIRIN: ICD-10-CM

## 2021-03-15 DIAGNOSIS — I73.00 RAYNAUD'S SYNDROME WITHOUT GANGRENE: ICD-10-CM

## 2021-03-15 DIAGNOSIS — M06.9 RHEUMATOID ARTHRITIS, UNSPECIFIED: ICD-10-CM

## 2021-03-15 DIAGNOSIS — Z87.442 PERSONAL HISTORY OF URINARY CALCULI: ICD-10-CM

## 2021-04-01 ENCOUNTER — APPOINTMENT (OUTPATIENT)
Dept: UROLOGY | Facility: CLINIC | Age: 56
End: 2021-04-01

## 2021-04-07 PROBLEM — N20.0 CALCULUS OF KIDNEY: Chronic | Status: ACTIVE | Noted: 2021-03-10

## 2021-04-07 PROBLEM — I10 ESSENTIAL (PRIMARY) HYPERTENSION: Chronic | Status: ACTIVE | Noted: 2021-03-10

## 2021-04-07 PROBLEM — H26.9 UNSPECIFIED CATARACT: Chronic | Status: ACTIVE | Noted: 2021-03-10

## 2021-04-19 ENCOUNTER — APPOINTMENT (OUTPATIENT)
Dept: UROLOGY | Facility: CLINIC | Age: 56
End: 2021-04-19
Payer: COMMERCIAL

## 2021-04-19 PROCEDURE — 99072 ADDL SUPL MATRL&STAF TM PHE: CPT

## 2021-04-19 PROCEDURE — 99214 OFFICE O/P EST MOD 30 MIN: CPT

## 2021-05-05 ENCOUNTER — OUTPATIENT (OUTPATIENT)
Dept: OUTPATIENT SERVICES | Facility: HOSPITAL | Age: 56
LOS: 1 days | Discharge: HOME | End: 2021-05-05
Payer: MEDICARE

## 2021-05-05 VITALS
OXYGEN SATURATION: 99 % | HEART RATE: 64 BPM | DIASTOLIC BLOOD PRESSURE: 76 MMHG | RESPIRATION RATE: 16 BRPM | HEIGHT: 64 IN | TEMPERATURE: 96 F | SYSTOLIC BLOOD PRESSURE: 167 MMHG | WEIGHT: 154.98 LBS

## 2021-05-05 DIAGNOSIS — Z01.818 ENCOUNTER FOR OTHER PREPROCEDURAL EXAMINATION: ICD-10-CM

## 2021-05-05 DIAGNOSIS — N20.2 CALCULUS OF KIDNEY WITH CALCULUS OF URETER: ICD-10-CM

## 2021-05-05 DIAGNOSIS — Z98.890 OTHER SPECIFIED POSTPROCEDURAL STATES: Chronic | ICD-10-CM

## 2021-05-05 DIAGNOSIS — J38.1 POLYP OF VOCAL CORD AND LARYNX: Chronic | ICD-10-CM

## 2021-05-05 LAB
ALBUMIN SERPL ELPH-MCNC: 4.5 G/DL — SIGNIFICANT CHANGE UP (ref 3.5–5.2)
ALP SERPL-CCNC: 100 U/L — SIGNIFICANT CHANGE UP (ref 30–115)
ALT FLD-CCNC: 13 U/L — SIGNIFICANT CHANGE UP (ref 0–41)
ANION GAP SERPL CALC-SCNC: 11 MMOL/L — SIGNIFICANT CHANGE UP (ref 7–14)
APPEARANCE UR: CLEAR — SIGNIFICANT CHANGE UP
APTT BLD: 37.1 SEC — SIGNIFICANT CHANGE UP (ref 27–39.2)
AST SERPL-CCNC: 19 U/L — SIGNIFICANT CHANGE UP (ref 0–41)
BASOPHILS # BLD AUTO: 0.03 K/UL — SIGNIFICANT CHANGE UP (ref 0–0.2)
BASOPHILS NFR BLD AUTO: 0.5 % — SIGNIFICANT CHANGE UP (ref 0–1)
BILIRUB SERPL-MCNC: 0.3 MG/DL — SIGNIFICANT CHANGE UP (ref 0.2–1.2)
BILIRUB UR-MCNC: NEGATIVE — SIGNIFICANT CHANGE UP
BUN SERPL-MCNC: 15 MG/DL — SIGNIFICANT CHANGE UP (ref 10–20)
CALCIUM SERPL-MCNC: 9.8 MG/DL — SIGNIFICANT CHANGE UP (ref 8.5–10.1)
CHLORIDE SERPL-SCNC: 105 MMOL/L — SIGNIFICANT CHANGE UP (ref 98–110)
CO2 SERPL-SCNC: 26 MMOL/L — SIGNIFICANT CHANGE UP (ref 17–32)
COLOR SPEC: SIGNIFICANT CHANGE UP
CREAT SERPL-MCNC: 1 MG/DL — SIGNIFICANT CHANGE UP (ref 0.7–1.5)
DIFF PNL FLD: NEGATIVE — SIGNIFICANT CHANGE UP
EOSINOPHIL # BLD AUTO: 0.09 K/UL — SIGNIFICANT CHANGE UP (ref 0–0.7)
EOSINOPHIL NFR BLD AUTO: 1.5 % — SIGNIFICANT CHANGE UP (ref 0–8)
GLUCOSE SERPL-MCNC: 112 MG/DL — HIGH (ref 70–99)
GLUCOSE UR QL: NEGATIVE — SIGNIFICANT CHANGE UP
HCT VFR BLD CALC: 40.6 % — SIGNIFICANT CHANGE UP (ref 37–47)
HGB BLD-MCNC: 12.9 G/DL — SIGNIFICANT CHANGE UP (ref 12–16)
IMM GRANULOCYTES NFR BLD AUTO: 0.2 % — SIGNIFICANT CHANGE UP (ref 0.1–0.3)
INR BLD: 0.94 RATIO — SIGNIFICANT CHANGE UP (ref 0.65–1.3)
KETONES UR-MCNC: NEGATIVE — SIGNIFICANT CHANGE UP
LEUKOCYTE ESTERASE UR-ACNC: NEGATIVE — SIGNIFICANT CHANGE UP
LYMPHOCYTES # BLD AUTO: 2.57 K/UL — SIGNIFICANT CHANGE UP (ref 1.2–3.4)
LYMPHOCYTES # BLD AUTO: 42.2 % — SIGNIFICANT CHANGE UP (ref 20.5–51.1)
MCHC RBC-ENTMCNC: 26.5 PG — LOW (ref 27–31)
MCHC RBC-ENTMCNC: 31.8 G/DL — LOW (ref 32–37)
MCV RBC AUTO: 83.4 FL — SIGNIFICANT CHANGE UP (ref 81–99)
MONOCYTES # BLD AUTO: 0.61 K/UL — HIGH (ref 0.1–0.6)
MONOCYTES NFR BLD AUTO: 10 % — HIGH (ref 1.7–9.3)
NEUTROPHILS # BLD AUTO: 2.78 K/UL — SIGNIFICANT CHANGE UP (ref 1.4–6.5)
NEUTROPHILS NFR BLD AUTO: 45.6 % — SIGNIFICANT CHANGE UP (ref 42.2–75.2)
NITRITE UR-MCNC: NEGATIVE — SIGNIFICANT CHANGE UP
NRBC # BLD: 0 /100 WBCS — SIGNIFICANT CHANGE UP (ref 0–0)
PH UR: 7 — SIGNIFICANT CHANGE UP (ref 5–8)
PLATELET # BLD AUTO: 254 K/UL — SIGNIFICANT CHANGE UP (ref 130–400)
POTASSIUM SERPL-MCNC: 4.3 MMOL/L — SIGNIFICANT CHANGE UP (ref 3.5–5)
POTASSIUM SERPL-SCNC: 4.3 MMOL/L — SIGNIFICANT CHANGE UP (ref 3.5–5)
PROT SERPL-MCNC: 7 G/DL — SIGNIFICANT CHANGE UP (ref 6–8)
PROT UR-MCNC: SIGNIFICANT CHANGE UP
PROTHROM AB SERPL-ACNC: 10.8 SEC — SIGNIFICANT CHANGE UP (ref 9.95–12.87)
RBC # BLD: 4.87 M/UL — SIGNIFICANT CHANGE UP (ref 4.2–5.4)
RBC # FLD: 13.3 % — SIGNIFICANT CHANGE UP (ref 11.5–14.5)
SODIUM SERPL-SCNC: 142 MMOL/L — SIGNIFICANT CHANGE UP (ref 135–146)
SP GR SPEC: 1.01 — SIGNIFICANT CHANGE UP (ref 1.01–1.03)
UROBILINOGEN FLD QL: SIGNIFICANT CHANGE UP
WBC # BLD: 6.09 K/UL — SIGNIFICANT CHANGE UP (ref 4.8–10.8)
WBC # FLD AUTO: 6.09 K/UL — SIGNIFICANT CHANGE UP (ref 4.8–10.8)

## 2021-05-05 PROCEDURE — 93010 ELECTROCARDIOGRAM REPORT: CPT

## 2021-05-05 RX ORDER — CALCIUM CARBONATE 500(1250)
0.5 TABLET ORAL
Qty: 0 | Refills: 0 | DISCHARGE

## 2021-05-05 NOTE — H&P PST ADULT - NSICDXPASTMEDICALHX_GEN_ALL_CORE_FT
PAST MEDICAL HISTORY:  Arthritis     Cataracts, bilateral REMOVED WITH IOLI    Chronic kidney disease (CKD)     HTN (hypertension)     Lupus     Raynauds disease     Renal calculi     Rheumatoid arthritis

## 2021-05-05 NOTE — H&P PST ADULT - HISTORY OF PRESENT ILLNESS
54 yo female presents for PAST in preparation for right ESWL scheduled on 5/26.  Pt complains of reoccurring kidney stones. Pt had routine kidney US-diagnosed with multiple stones. Currently denies UTI S/S, fever, chills.     Denies any chest pain, difficulty breathing, SOB, palpitations, URI, or any other infections in the last 2 weeks/1 month. Denies any recent travel, contact, or exposure to any persons with known or suspected COVID-19. Pt also denies COVID testing within the last 2 weeks. Pt advised to self quarantine until day of procedure. Exercise tolerance of 2-3 flights of stairs without dyspnea. CLAUDIA reviewed with patient.    Anesthesia Alert  NO--Difficult Airway  NO--History of neck surgery or radiation  NO--Limited ROM of neck  NO--History of Malignant hyperthermia  NO--Personal or family history of Pseudocholinesterase deficiency.  YES--Prior Anesthesia Complication, blood pressure dropped   NO--Latex Allergy  NO--Loose teeth  NO--History of Rheumatoid Arthritis  NO--CLAUDIA  NO--Bleeding risk  NO--Other_____     written and verbal instructions with teach back on chlorhexidine shampoo provided,  pt verbalized understanding with returned demonstration  Pt instructed to stop vitamins/supplements/herbal medications/ASA/NSAIDS for one week prior to surgery and discuss with PMD.  Patient verbalized understanding of instructions and was given the opportunity to ask questions and have them answered.

## 2021-05-05 NOTE — H&P PST ADULT - NSICDXPASTSURGICALHX_GEN_ALL_CORE_FT
PAST SURGICAL HISTORY:  H/O colonoscopy 2018    H/O lithotripsy eswal    History of D&C     History of surgery renal biopsy    History of surgery B/L IOL    Laryngeal polyp

## 2021-05-05 NOTE — H&P PST ADULT - ATTENDING COMMENTS
pt with right renal stone for right eswl pt explained risks including bleeding infection risks of fragments getting caught for or today

## 2021-05-06 LAB
CULTURE RESULTS: SIGNIFICANT CHANGE UP
SPECIMEN SOURCE: SIGNIFICANT CHANGE UP

## 2021-05-23 ENCOUNTER — OUTPATIENT (OUTPATIENT)
Dept: OUTPATIENT SERVICES | Facility: HOSPITAL | Age: 56
LOS: 1 days | Discharge: HOME | End: 2021-05-23

## 2021-05-23 DIAGNOSIS — Z98.890 OTHER SPECIFIED POSTPROCEDURAL STATES: Chronic | ICD-10-CM

## 2021-05-23 DIAGNOSIS — J38.1 POLYP OF VOCAL CORD AND LARYNX: Chronic | ICD-10-CM

## 2021-05-23 DIAGNOSIS — Z11.59 ENCOUNTER FOR SCREENING FOR OTHER VIRAL DISEASES: ICD-10-CM

## 2021-05-26 ENCOUNTER — OUTPATIENT (OUTPATIENT)
Dept: OUTPATIENT SERVICES | Facility: HOSPITAL | Age: 56
LOS: 1 days | Discharge: HOME | End: 2021-05-26
Payer: COMMERCIAL

## 2021-05-26 VITALS — HEART RATE: 83 BPM | DIASTOLIC BLOOD PRESSURE: 78 MMHG | SYSTOLIC BLOOD PRESSURE: 150 MMHG | RESPIRATION RATE: 17 BRPM

## 2021-05-26 VITALS
WEIGHT: 162.04 LBS | RESPIRATION RATE: 17 BRPM | TEMPERATURE: 97 F | OXYGEN SATURATION: 100 % | SYSTOLIC BLOOD PRESSURE: 171 MMHG | HEART RATE: 75 BPM | HEIGHT: 64 IN | DIASTOLIC BLOOD PRESSURE: 90 MMHG

## 2021-05-26 DIAGNOSIS — Z98.890 OTHER SPECIFIED POSTPROCEDURAL STATES: Chronic | ICD-10-CM

## 2021-05-26 DIAGNOSIS — J38.1 POLYP OF VOCAL CORD AND LARYNX: Chronic | ICD-10-CM

## 2021-05-26 PROCEDURE — 74018 RADEX ABDOMEN 1 VIEW: CPT | Mod: 26

## 2021-05-26 RX ORDER — MORPHINE SULFATE 50 MG/1
2 CAPSULE, EXTENDED RELEASE ORAL
Refills: 0 | Status: DISCONTINUED | OUTPATIENT
Start: 2021-05-26 | End: 2021-05-26

## 2021-05-26 RX ORDER — HYDROMORPHONE HYDROCHLORIDE 2 MG/ML
0.5 INJECTION INTRAMUSCULAR; INTRAVENOUS; SUBCUTANEOUS
Refills: 0 | Status: DISCONTINUED | OUTPATIENT
Start: 2021-05-26 | End: 2021-05-26

## 2021-05-26 RX ORDER — ONDANSETRON 8 MG/1
4 TABLET, FILM COATED ORAL ONCE
Refills: 0 | Status: DISCONTINUED | OUTPATIENT
Start: 2021-05-26 | End: 2021-06-09

## 2021-05-26 RX ORDER — SODIUM CHLORIDE 9 MG/ML
1000 INJECTION, SOLUTION INTRAVENOUS
Refills: 0 | Status: DISCONTINUED | OUTPATIENT
Start: 2021-05-26 | End: 2021-06-09

## 2021-05-26 RX ORDER — OXYCODONE AND ACETAMINOPHEN 5; 325 MG/1; MG/1
1 TABLET ORAL ONCE
Refills: 0 | Status: DISCONTINUED | OUTPATIENT
Start: 2021-05-26 | End: 2021-05-26

## 2021-05-26 RX ADMIN — SODIUM CHLORIDE 75 MILLILITER(S): 9 INJECTION, SOLUTION INTRAVENOUS at 10:34

## 2021-05-26 NOTE — ASU PREOP CHECKLIST - SIDE RAILS UP
Telephone Encounter by Adina Zayas RN at 01/20/18 11:07 AM     Author:  Adina Zayas RN Service:  (none) Author Type:  Registered Nurse     Filed:  01/20/18 11:08 AM Encounter Date:  1/20/2018 Status:  Signed     :  Adina Zayas RN (Registered Nurse)            Spoke with mom.  She was diagnosed today at [facility] with Influenza and started on Tamiflu.  Mom was advised to have her children start on Tamiflu since they started yesterday with cough and nasal discharge/congestion.    Lori, will you prescribe? Please advise.[CM1.1C]         Revision History        User Key Date/Time User Provider Type Action    > CM1.1 01/20/18 11:08 AM Adina Zayas RN Registered Nurse Sign    C - Copied             done

## 2021-06-08 DIAGNOSIS — N20.0 CALCULUS OF KIDNEY: ICD-10-CM

## 2021-06-08 DIAGNOSIS — Z79.82 LONG TERM (CURRENT) USE OF ASPIRIN: ICD-10-CM

## 2021-06-08 DIAGNOSIS — I10 ESSENTIAL (PRIMARY) HYPERTENSION: ICD-10-CM

## 2022-02-14 ENCOUNTER — APPOINTMENT (OUTPATIENT)
Dept: UROLOGY | Facility: CLINIC | Age: 57
End: 2022-02-14
Payer: COMMERCIAL

## 2022-02-14 VITALS — WEIGHT: 163 LBS | BODY MASS INDEX: 30 KG/M2 | TEMPERATURE: 97 F | HEIGHT: 62 IN

## 2022-02-14 DIAGNOSIS — N20.1 CALCULUS OF URETER: ICD-10-CM

## 2022-02-14 PROCEDURE — 99214 OFFICE O/P EST MOD 30 MIN: CPT

## 2022-02-15 PROBLEM — N20.1 URETERAL STONE: Status: ACTIVE | Noted: 2020-12-10

## 2022-02-15 NOTE — ASSESSMENT
[FreeTextEntry1] : 57 yo with longstanding history of nephrolithiasis \par prior PCNL with bleeding\par subsequent ureteric stricture from impacted ureteral stone\par s/p diagnostic ureteroscopy 1 year prior with retrograde pyelogram\par \par patient presents with impacted new stone vs stricture recurrence\par \par - PAST\par - LEFT ureteroscopy, laser lithotripsy and ureteral dilatation\par - explained that if I cannot pass beyond the blockage she will require a segmental ureteral resection with end to end anastomosis vs reimplant (to be determined following attempt at diagnostic ureteroscopy, laser lithotripsy) [Ureteral Stone (592.1\N20.1)] : position

## 2022-02-15 NOTE — HISTORY OF PRESENT ILLNESS
[FreeTextEntry1] : 54 yo female with left ureteral stone and hydronephrosis\par the patient underwent a PCNL in [facility] with subsequent bleeding and transfusion\par presents now for definitive management of her LEFTt sided hydronephrosis due to a 4mm calculus ([facility] 10/28/2020)\par \par she underwent left ureteroscopy, laser lithotripsy and stent on 1/13/2021\par she was found to have a dense ureteral stricture\par \par this was explained to the patient in detail

## 2022-02-15 NOTE — ASSESSMENT
[Hydronephrosis (591\N13.30)] : Salter-James type I [Ureteral Stone (592.1\N20.1)] : position [FreeTextEntry1] : 54 yo with longstanding kidney stones\par ureteral stricture encountered at time of ureteral stone management\par \par discussed the need to re-assess the ureter \par explained the need to re-assess for stricture / scar tissue\par \par this was explained in detail\par all questions answered

## 2022-02-15 NOTE — HISTORY OF PRESENT ILLNESS
[FreeTextEntry1] : 56 yo female with left ureteral stone and hydronephrosis\par the patient underwent a PCNL in [facility] with subsequent bleeding and transfusion\par presents now for definitive management of her LEFT sided hydronephrosis due to a 4mm calculus ([facility] 10/28/2020)\par \par the patient is asymptomatic\par \par in addition to that stone she has bilateral non obstructing renal stones

## 2022-02-15 NOTE — HISTORY OF PRESENT ILLNESS
[FreeTextEntry1] : 54 yo female with left ureteral stone and hydronephrosis\par the patient underwent a PCNL in [facility] with subsequent bleeding and transfusion\par presents now for definitive management of her LEFT sided hydronephrosis due to a 4mm calculus ([facility] 10/28/2020)\par \par she underwent left ureteroscopy, laser lithotripsy and stent on 1/13/2021\par she was found to have a dense ureteral stricture\par she had a stent left in place\par the stent was removed and diagnostic ureteroscopy and retrograde ureteropyelogram performed\par \par the films were reviewed with the patient\par \par no evidence of persistent stricture appreciated

## 2022-02-15 NOTE — PHYSICAL EXAM
[General Appearance - Well Developed] : well developed [General Appearance - Well Nourished] : well nourished [Normal Appearance] : normal appearance [Well Groomed] : well groomed [General Appearance - In No Acute Distress] : no acute distress [Abdomen Soft] : soft [Abdomen Tenderness] : non-tender [Costovertebral Angle Tenderness] : no ~M costovertebral angle tenderness [Urinary Bladder Findings] : the bladder was normal on palpation [] : no respiratory distress [Edema] : no peripheral edema [Respiration, Rhythm And Depth] : normal respiratory rhythm and effort [Exaggerated Use Of Accessory Muscles For Inspiration] : no accessory muscle use [Oriented To Time, Place, And Person] : oriented to person, place, and time [Affect] : the affect was normal [Mood] : the mood was normal [Not Anxious] : not anxious [Normal Station and Gait] : the gait and station were normal for the patient's age [No Focal Deficits] : no focal deficits [No Palpable Adenopathy] : no palpable adenopathy

## 2022-02-15 NOTE — LETTER BODY
[Dear  ___] : Dear ~TOMMIE, [Consult Letter:] : I had the pleasure of evaluating your patient, [unfilled]. [Please see my note below.] : Please see my note below. [Sincerely,] : Sincerely, [FreeTextEntry3] : Mesfin Robison MD, FACS\par

## 2022-02-15 NOTE — ASSESSMENT
[Ureteral Stone (592.1\N20.1)] : position [FreeTextEntry1] : 54 yo with longstanding kidney stones\par ureteral stricture encountered at time of ureteral stone management\par repeat imaging and ureteroscopy failed to identify residual stricture\par \par - she will be undergoing ESWl by Dr. Cleary\par - F/U as needed\par - the need for a 24 hour urine collection reinforced (will be performed by Dr. Cleary) to prevent further and future stone formation\par

## 2022-02-15 NOTE — LETTER BODY
[Dear  ___] : Dear  [unfilled], [Consult Letter:] : I had the pleasure of evaluating your patient, [unfilled]. [Please see my note below.] : Please see my note below. [Sincerely,] : Sincerely, [FreeTextEntry3] : Mesfin Robison MD, FACS\par

## 2022-02-15 NOTE — ASSESSMENT
[Hydronephrosis (591\N13.30)] : Salter-James type I [Ureteral Stone (592.1\N20.1)] : position [FreeTextEntry1] : 56 yo female with left ureteral stone and hydroureteronephrosis\par \par - PAST\par - ureteroscopy, laser lithotripsy and stent\par - full R+B explained\par - all options provided

## 2022-02-24 ENCOUNTER — OUTPATIENT (OUTPATIENT)
Dept: OUTPATIENT SERVICES | Facility: HOSPITAL | Age: 57
LOS: 1 days | Discharge: HOME | End: 2022-02-24
Payer: MEDICARE

## 2022-02-24 ENCOUNTER — RESULT REVIEW (OUTPATIENT)
Age: 57
End: 2022-02-24

## 2022-02-24 VITALS
HEIGHT: 63 IN | SYSTOLIC BLOOD PRESSURE: 154 MMHG | OXYGEN SATURATION: 96 % | WEIGHT: 164.02 LBS | TEMPERATURE: 97 F | RESPIRATION RATE: 16 BRPM | HEART RATE: 76 BPM | DIASTOLIC BLOOD PRESSURE: 90 MMHG

## 2022-02-24 DIAGNOSIS — J38.1 POLYP OF VOCAL CORD AND LARYNX: Chronic | ICD-10-CM

## 2022-02-24 DIAGNOSIS — Z01.818 ENCOUNTER FOR OTHER PREPROCEDURAL EXAMINATION: ICD-10-CM

## 2022-02-24 DIAGNOSIS — Z98.890 OTHER SPECIFIED POSTPROCEDURAL STATES: Chronic | ICD-10-CM

## 2022-02-24 DIAGNOSIS — N20.1 CALCULUS OF URETER: ICD-10-CM

## 2022-02-24 LAB
ALBUMIN SERPL ELPH-MCNC: 4.6 G/DL — SIGNIFICANT CHANGE UP (ref 3.5–5.2)
ALP SERPL-CCNC: 105 U/L — SIGNIFICANT CHANGE UP (ref 30–115)
ALT FLD-CCNC: 17 U/L — SIGNIFICANT CHANGE UP (ref 0–41)
ANION GAP SERPL CALC-SCNC: 14 MMOL/L — SIGNIFICANT CHANGE UP (ref 7–14)
APPEARANCE UR: ABNORMAL
APTT BLD: 35 SEC — SIGNIFICANT CHANGE UP (ref 27–39.2)
AST SERPL-CCNC: 20 U/L — SIGNIFICANT CHANGE UP (ref 0–41)
BASOPHILS # BLD AUTO: 0.04 K/UL — SIGNIFICANT CHANGE UP (ref 0–0.2)
BASOPHILS NFR BLD AUTO: 0.4 % — SIGNIFICANT CHANGE UP (ref 0–1)
BILIRUB SERPL-MCNC: 0.2 MG/DL — SIGNIFICANT CHANGE UP (ref 0.2–1.2)
BILIRUB UR-MCNC: NEGATIVE — SIGNIFICANT CHANGE UP
BUN SERPL-MCNC: 23 MG/DL — HIGH (ref 10–20)
CALCIUM SERPL-MCNC: 10.1 MG/DL — SIGNIFICANT CHANGE UP (ref 8.5–10.1)
CHLORIDE SERPL-SCNC: 100 MMOL/L — SIGNIFICANT CHANGE UP (ref 98–110)
CO2 SERPL-SCNC: 24 MMOL/L — SIGNIFICANT CHANGE UP (ref 17–32)
COLOR SPEC: SIGNIFICANT CHANGE UP
CREAT SERPL-MCNC: 0.9 MG/DL — SIGNIFICANT CHANGE UP (ref 0.7–1.5)
DIFF PNL FLD: NEGATIVE — SIGNIFICANT CHANGE UP
EOSINOPHIL # BLD AUTO: 0.09 K/UL — SIGNIFICANT CHANGE UP (ref 0–0.7)
EOSINOPHIL NFR BLD AUTO: 1 % — SIGNIFICANT CHANGE UP (ref 0–8)
GLUCOSE SERPL-MCNC: 101 MG/DL — HIGH (ref 70–99)
GLUCOSE UR QL: NEGATIVE — SIGNIFICANT CHANGE UP
HCT VFR BLD CALC: 41.4 % — SIGNIFICANT CHANGE UP (ref 37–47)
HGB BLD-MCNC: 13.1 G/DL — SIGNIFICANT CHANGE UP (ref 12–16)
IMM GRANULOCYTES NFR BLD AUTO: 0.2 % — SIGNIFICANT CHANGE UP (ref 0.1–0.3)
INR BLD: 0.94 RATIO — SIGNIFICANT CHANGE UP (ref 0.65–1.3)
KETONES UR-MCNC: NEGATIVE — SIGNIFICANT CHANGE UP
LEUKOCYTE ESTERASE UR-ACNC: NEGATIVE — SIGNIFICANT CHANGE UP
LYMPHOCYTES # BLD AUTO: 3.66 K/UL — HIGH (ref 1.2–3.4)
LYMPHOCYTES # BLD AUTO: 39 % — SIGNIFICANT CHANGE UP (ref 20.5–51.1)
MCHC RBC-ENTMCNC: 26.8 PG — LOW (ref 27–31)
MCHC RBC-ENTMCNC: 31.6 G/DL — LOW (ref 32–37)
MCV RBC AUTO: 84.8 FL — SIGNIFICANT CHANGE UP (ref 81–99)
MONOCYTES # BLD AUTO: 0.77 K/UL — HIGH (ref 0.1–0.6)
MONOCYTES NFR BLD AUTO: 8.2 % — SIGNIFICANT CHANGE UP (ref 1.7–9.3)
NEUTROPHILS # BLD AUTO: 4.8 K/UL — SIGNIFICANT CHANGE UP (ref 1.4–6.5)
NEUTROPHILS NFR BLD AUTO: 51.2 % — SIGNIFICANT CHANGE UP (ref 42.2–75.2)
NITRITE UR-MCNC: NEGATIVE — SIGNIFICANT CHANGE UP
NRBC # BLD: 0 /100 WBCS — SIGNIFICANT CHANGE UP (ref 0–0)
PH UR: 6.5 — SIGNIFICANT CHANGE UP (ref 5–8)
PLATELET # BLD AUTO: 258 K/UL — SIGNIFICANT CHANGE UP (ref 130–400)
POTASSIUM SERPL-MCNC: 4.5 MMOL/L — SIGNIFICANT CHANGE UP (ref 3.5–5)
POTASSIUM SERPL-SCNC: 4.5 MMOL/L — SIGNIFICANT CHANGE UP (ref 3.5–5)
PROT SERPL-MCNC: 7.3 G/DL — SIGNIFICANT CHANGE UP (ref 6–8)
PROT UR-MCNC: ABNORMAL
PROTHROM AB SERPL-ACNC: 10.8 SEC — SIGNIFICANT CHANGE UP (ref 9.95–12.87)
RBC # BLD: 4.88 M/UL — SIGNIFICANT CHANGE UP (ref 4.2–5.4)
RBC # FLD: 13.2 % — SIGNIFICANT CHANGE UP (ref 11.5–14.5)
SODIUM SERPL-SCNC: 138 MMOL/L — SIGNIFICANT CHANGE UP (ref 135–146)
SP GR SPEC: 1.02 — SIGNIFICANT CHANGE UP (ref 1.01–1.03)
UROBILINOGEN FLD QL: SIGNIFICANT CHANGE UP
WBC # BLD: 9.38 K/UL — SIGNIFICANT CHANGE UP (ref 4.8–10.8)
WBC # FLD AUTO: 9.38 K/UL — SIGNIFICANT CHANGE UP (ref 4.8–10.8)

## 2022-02-24 PROCEDURE — 93010 ELECTROCARDIOGRAM REPORT: CPT

## 2022-02-24 PROCEDURE — 71046 X-RAY EXAM CHEST 2 VIEWS: CPT | Mod: 26

## 2022-02-24 RX ORDER — MYCOPHENOLATE MOFETIL 250 MG/1
500 CAPSULE ORAL
Qty: 0 | Refills: 0 | DISCHARGE

## 2022-02-24 RX ORDER — POTASSIUM CITRATE MONOHYDRATE 100 %
0 POWDER (GRAM) MISCELLANEOUS
Qty: 0 | Refills: 0 | DISCHARGE

## 2022-02-24 NOTE — H&P PST ADULT - HISTORY OF PRESENT ILLNESS
56yr old female with h/o kidney stones is scheduled for CYSTOSCOPY LEFT URETEROSCOPY LASER LITHOTRIPSY AND JJ STENT PLACEMENT. Denies COVID S/S. Recd 2 doses of the vaccine. Denies dysuria. Verbalized understanding of COVID prevention measures. Exercise anabelle 2 FOS.  Anesthesia Alert  NO--Difficult Airway  NO--History of neck surgery or radiation  NO--Limited ROM of neck  NO--History of Malignant hyperthermia  NO--Personal or family history of Pseudocholinesterase deficiency  NO--Prior Anesthesia Complication  NO--Latex Allergy  NO--Loose teeth  NO--History of Rheumatoid Arthritis  NO--CLAUDIA  No Bleeding risk  NO--Other_____

## 2022-02-24 NOTE — H&P PST ADULT - NS ABD PE RECTAL EXAM
not examined previous_biopsy_has_been_previously_biopsied Body Location Override (Optional): right superior upper back

## 2022-02-24 NOTE — H&P PST ADULT - NSICDXPASTMEDICALHX_GEN_ALL_CORE_FT
PAST MEDICAL HISTORY:  Arthritis     Cataracts, bilateral REMOVED WITH IOLI    Chronic kidney disease (CKD)     HTN (hypertension)     Lupus     Raynauds disease     Renal calculi     Rheumatoid arthritis     Scarring of lung ? d/t Lupus

## 2022-02-25 LAB
BACTERIA # UR AUTO: NEGATIVE — SIGNIFICANT CHANGE UP
COD CRY URNS QL: ABNORMAL
CULTURE RESULTS: SIGNIFICANT CHANGE UP
EPI CELLS # UR: 1 /HPF — SIGNIFICANT CHANGE UP (ref 0–5)
HYALINE CASTS # UR AUTO: 1 /LPF — SIGNIFICANT CHANGE UP (ref 0–7)
RBC CASTS # UR COMP ASSIST: 2 /HPF — SIGNIFICANT CHANGE UP (ref 0–4)
SPECIMEN SOURCE: SIGNIFICANT CHANGE UP
WBC UR QL: 5 /HPF — SIGNIFICANT CHANGE UP (ref 0–5)

## 2022-03-06 ENCOUNTER — LABORATORY RESULT (OUTPATIENT)
Age: 57
End: 2022-03-06

## 2022-03-09 ENCOUNTER — APPOINTMENT (OUTPATIENT)
Dept: UROLOGY | Facility: HOSPITAL | Age: 57
End: 2022-03-09

## 2022-03-09 ENCOUNTER — OUTPATIENT (OUTPATIENT)
Dept: OUTPATIENT SERVICES | Facility: HOSPITAL | Age: 57
LOS: 1 days | Discharge: HOME | End: 2022-03-09
Payer: MEDICARE

## 2022-03-09 VITALS
SYSTOLIC BLOOD PRESSURE: 144 MMHG | HEART RATE: 67 BPM | RESPIRATION RATE: 18 BRPM | OXYGEN SATURATION: 96 % | DIASTOLIC BLOOD PRESSURE: 75 MMHG | WEIGHT: 162.92 LBS | HEIGHT: 62 IN | TEMPERATURE: 97 F

## 2022-03-09 VITALS — HEART RATE: 74 BPM | SYSTOLIC BLOOD PRESSURE: 176 MMHG | DIASTOLIC BLOOD PRESSURE: 86 MMHG | RESPIRATION RATE: 6 BRPM

## 2022-03-09 DIAGNOSIS — Z98.890 OTHER SPECIFIED POSTPROCEDURAL STATES: Chronic | ICD-10-CM

## 2022-03-09 DIAGNOSIS — J38.1 POLYP OF VOCAL CORD AND LARYNX: Chronic | ICD-10-CM

## 2022-03-09 PROBLEM — J98.4 OTHER DISORDERS OF LUNG: Chronic | Status: ACTIVE | Noted: 2022-02-24

## 2022-03-09 PROCEDURE — 52356 CYSTO/URETERO W/LITHOTRIPSY: CPT | Mod: LT

## 2022-03-09 RX ORDER — ONDANSETRON 8 MG/1
4 TABLET, FILM COATED ORAL ONCE
Refills: 0 | Status: DISCONTINUED | OUTPATIENT
Start: 2022-03-09 | End: 2022-03-23

## 2022-03-09 RX ORDER — SODIUM CHLORIDE 9 MG/ML
1000 INJECTION, SOLUTION INTRAVENOUS
Refills: 0 | Status: DISCONTINUED | OUTPATIENT
Start: 2022-03-09 | End: 2022-03-23

## 2022-03-09 RX ORDER — HYDROMORPHONE HYDROCHLORIDE 2 MG/ML
1 INJECTION INTRAMUSCULAR; INTRAVENOUS; SUBCUTANEOUS ONCE
Refills: 0 | Status: DISCONTINUED | OUTPATIENT
Start: 2022-03-09 | End: 2022-03-09

## 2022-03-09 RX ORDER — HYDROMORPHONE HYDROCHLORIDE 2 MG/ML
0.5 INJECTION INTRAMUSCULAR; INTRAVENOUS; SUBCUTANEOUS
Refills: 0 | Status: DISCONTINUED | OUTPATIENT
Start: 2022-03-09 | End: 2022-03-09

## 2022-03-09 NOTE — BRIEF OPERATIVE NOTE - NSICDXBRIEFPROCEDURE_GEN_ALL_CORE_FT
PROCEDURES:  Cystoscopy with left retrograde pyelography with ureteroscopy and procedure involving calculus 09-Mar-2022 14:04:49  Phan Robison  Insertion, ureteral stent 09-Mar-2022 14:05:00  Phan Robison

## 2022-03-09 NOTE — CHART NOTE - NSCHARTNOTEFT_GEN_A_CORE
PACU ANESTHESIA ADMISSION NOTE      Procedure: Cystoscopy, ;left ureteroscopy, JJ stent  Post op diagnosis:  Hydronephrosis    ____  Intubated  TV:______       Rate: ______      FiO2: ______    _x___  Patent Airway    __x__  Full return of protective reflexes    ___x_  Full recovery from anesthesia / back to baseline     Vitals:   T: 98          R:  18                BP:  165/84                Sat: 98%                  P: 83      Mental Status:  ___x_ Awake   _____ Alert   _____ Drowsy   _____ Sedated    Nausea/Vomiting:  ___x_ NO  ______Yes,   See Post - Op Orders          Pain Scale (0-10):  _____    Treatment: ____ None    ___x_ See Post - Op/PCA Orders    Post - Operative Fluids:   ____ Oral   ___x_ See Post - Op Orders    Plan: Discharge:   __x__Home       _____Floor     _____Critical Care    _____  Other:_________________    Comments:

## 2022-03-09 NOTE — BRIEF OPERATIVE NOTE - OPERATION/FINDINGS
left ureteral stricture with stone embedded / epithelialized  laser lithotripsy  2 uretal stents placed on the left side

## 2022-03-09 NOTE — ASU DISCHARGE PLAN (ADULT/PEDIATRIC) - NS MD DC FALL RISK RISK
For information on Fall & Injury Prevention, visit: [email or web address]/[email or web address] OR  [email or web address].[facility]/[email or web address] OR  https://www.cdc.gov/steadi/patient.html

## 2022-03-09 NOTE — BRIEF OPERATIVE NOTE - NSICDXBRIEFPREOP_GEN_ALL_CORE_FT
PRE-OP DIAGNOSIS:  Left ureteral stone 09-Mar-2022 14:05:14  Phan Robison  Ureteral stricture, left 09-Mar-2022 14:05:23  Phan Robison  Hydronephrosis with obstructing calculus 09-Mar-2022 14:05:36  Phan Robison

## 2022-03-09 NOTE — PRE-ANESTHESIA EVALUATION ADULT - NSANTHPMHFT_GEN_ALL_CORE
55 y/o F with pmh of HTN, SLE, RA, reynauds who presents for CYSTOSCOPY LEFT URETEROSCOPY LASER LITHOTRIPSY AND JJ STENT PLACEMENT

## 2022-03-09 NOTE — BRIEF OPERATIVE NOTE - NSICDXBRIEFPOSTOP_GEN_ALL_CORE_FT
POST-OP DIAGNOSIS:  Hydronephrosis, left 09-Mar-2022 14:05:48  Phan Robison  Ureteral stricture, left 09-Mar-2022 14:05:56  Phan Robison  Left ureteral stone 09-Mar-2022 14:06:09  Phan Robison

## 2022-03-15 DIAGNOSIS — I10 ESSENTIAL (PRIMARY) HYPERTENSION: ICD-10-CM

## 2022-03-15 DIAGNOSIS — N20.1 CALCULUS OF URETER: ICD-10-CM

## 2022-04-04 ENCOUNTER — APPOINTMENT (OUTPATIENT)
Dept: UROLOGY | Facility: CLINIC | Age: 57
End: 2022-04-04
Payer: COMMERCIAL

## 2022-04-04 ENCOUNTER — LABORATORY RESULT (OUTPATIENT)
Age: 57
End: 2022-04-04

## 2022-04-04 VITALS — HEIGHT: 62 IN | BODY MASS INDEX: 30 KG/M2 | WEIGHT: 163 LBS

## 2022-04-04 PROCEDURE — 99214 OFFICE O/P EST MOD 30 MIN: CPT

## 2022-04-04 RX ORDER — AMLODIPINE BESYLATE 5 MG/1
5 TABLET ORAL
Qty: 90 | Refills: 3 | Status: ACTIVE | COMMUNITY

## 2022-04-04 RX ORDER — ASPIRIN 81 MG
81 TABLET, DELAYED RELEASE (ENTERIC COATED) ORAL DAILY
Refills: 0 | Status: ACTIVE | COMMUNITY

## 2022-04-04 RX ORDER — ALLOPURINOL 200 MG/1
TABLET ORAL
Refills: 0 | Status: ACTIVE | COMMUNITY

## 2022-04-04 RX ORDER — ASCORBIC ACID 500 MG
TABLET ORAL
Refills: 0 | Status: ACTIVE | COMMUNITY

## 2022-04-05 LAB
APPEARANCE: ABNORMAL
BILIRUBIN URINE: NEGATIVE
BLOOD URINE: ABNORMAL
COLOR: YELLOW
GLUCOSE QUALITATIVE U: NEGATIVE
KETONES URINE: NEGATIVE
LEUKOCYTE ESTERASE URINE: ABNORMAL
NITRITE URINE: POSITIVE
PH URINE: 6.5
PROTEIN URINE: ABNORMAL
SPECIFIC GRAVITY URINE: 1.01
UROBILINOGEN URINE: NORMAL

## 2022-04-07 ENCOUNTER — NON-APPOINTMENT (OUTPATIENT)
Age: 57
End: 2022-04-07

## 2022-04-07 NOTE — HISTORY OF PRESENT ILLNESS
[FreeTextEntry1] : 56 year old female with  left ureteral stone and hydronephrosis\par the patient underwent a PCNL at [facility] with subsequent bleeding and transfusion\par presents now for definitive management of her LEFT sided hydronephrosis due to a 4mm calculus ([facility] 10/28/2020)\par \par she underwent left ureteroscopy, laser lithotripsy and stent on 1/13/2021\par she was found to have a dense ureteral stricture\par she had a stent left in place\par the stent was removed and diagnostic ureteroscopy and retrograde ureteropyelogram performed.\par \par CT scan [facility] 1/18/2022\par SEVERE left hydro nephrosis due to a 5 mm calculus in the mid ureter\par \par Patient is now s/p left uteroscopy laser lithotripsy and 2 double J stents placed due to dilation of ureteral stricture done 03/09/2022.\par Patient presents to office stating she feels well. Denies flank pain and fevers. \par

## 2022-04-07 NOTE — ADDENDUM
[FreeTextEntry1] : Documented by SANDHYA Acosta acting as a scribe for Dr. Mesfin Robison \par \par All medical record entries made by the Scribe were at my, Dr. Robison direction and\par personally dictated by me.  I have reviewed the chart and agree that the record\par accurately reflects my personal performance of the history, physical exam, procedure and imaging.  \par  \par \par

## 2022-04-07 NOTE — ASSESSMENT
[FreeTextEntry1] : 57 yo with longstanding history of nephrolithiasis \par prior PCNL with bleeding\par subsequent ureteric stricture from impacted ureteral stone\par s/p diagnostic ureteroscopy 1 year prior with retrograde pyelogram\par \par patient presents with impacted new stone vs stricture recurrence\par s/p 3/2022 lithotripsy and ureter stricture dilation. \par \par Plan\par -UA and U Culture \par -Schedule OR for stent removal.

## 2022-04-10 ENCOUNTER — LABORATORY RESULT (OUTPATIENT)
Age: 57
End: 2022-04-10

## 2022-04-13 ENCOUNTER — TRANSCRIPTION ENCOUNTER (OUTPATIENT)
Age: 57
End: 2022-04-13

## 2022-04-13 ENCOUNTER — APPOINTMENT (OUTPATIENT)
Dept: UROLOGY | Facility: HOSPITAL | Age: 57
End: 2022-04-13

## 2022-04-13 ENCOUNTER — OUTPATIENT (OUTPATIENT)
Dept: OUTPATIENT SERVICES | Facility: HOSPITAL | Age: 57
LOS: 1 days | Discharge: HOME | End: 2022-04-13
Payer: MEDICARE

## 2022-04-13 VITALS — HEART RATE: 68 BPM | SYSTOLIC BLOOD PRESSURE: 134 MMHG | DIASTOLIC BLOOD PRESSURE: 77 MMHG

## 2022-04-13 VITALS
HEART RATE: 80 BPM | TEMPERATURE: 98 F | WEIGHT: 158.07 LBS | DIASTOLIC BLOOD PRESSURE: 84 MMHG | SYSTOLIC BLOOD PRESSURE: 141 MMHG | RESPIRATION RATE: 17 BRPM | HEIGHT: 63 IN | OXYGEN SATURATION: 98 %

## 2022-04-13 DIAGNOSIS — Z98.890 OTHER SPECIFIED POSTPROCEDURAL STATES: Chronic | ICD-10-CM

## 2022-04-13 DIAGNOSIS — J38.1 POLYP OF VOCAL CORD AND LARYNX: Chronic | ICD-10-CM

## 2022-04-13 PROCEDURE — 74400 UROGRAPHY IV +-KUB TOMOG: CPT | Mod: 26

## 2022-04-13 PROCEDURE — 52344 CYSTO/URETERO STRICTURE TX: CPT | Mod: LT

## 2022-04-13 RX ORDER — ACETAMINOPHEN 500 MG
650 TABLET ORAL ONCE
Refills: 0 | Status: DISCONTINUED | OUTPATIENT
Start: 2022-04-13 | End: 2022-04-27

## 2022-04-13 RX ORDER — SODIUM CHLORIDE 9 MG/ML
1000 INJECTION, SOLUTION INTRAVENOUS
Refills: 0 | Status: DISCONTINUED | OUTPATIENT
Start: 2022-04-13 | End: 2022-04-27

## 2022-04-13 RX ORDER — HYDROMORPHONE HYDROCHLORIDE 2 MG/ML
1 INJECTION INTRAMUSCULAR; INTRAVENOUS; SUBCUTANEOUS
Refills: 0 | Status: DISCONTINUED | OUTPATIENT
Start: 2022-04-13 | End: 2022-04-13

## 2022-04-13 RX ORDER — MEPERIDINE HYDROCHLORIDE 50 MG/ML
12.5 INJECTION INTRAMUSCULAR; INTRAVENOUS; SUBCUTANEOUS ONCE
Refills: 0 | Status: DISCONTINUED | OUTPATIENT
Start: 2022-04-13 | End: 2022-04-13

## 2022-04-13 RX ORDER — ONDANSETRON 8 MG/1
4 TABLET, FILM COATED ORAL ONCE
Refills: 0 | Status: DISCONTINUED | OUTPATIENT
Start: 2022-04-13 | End: 2022-04-27

## 2022-04-13 RX ORDER — HYDROMORPHONE HYDROCHLORIDE 2 MG/ML
0.5 INJECTION INTRAMUSCULAR; INTRAVENOUS; SUBCUTANEOUS
Refills: 0 | Status: DISCONTINUED | OUTPATIENT
Start: 2022-04-13 | End: 2022-04-13

## 2022-04-13 RX ADMIN — SODIUM CHLORIDE 100 MILLILITER(S): 9 INJECTION, SOLUTION INTRAVENOUS at 10:45

## 2022-04-13 NOTE — CHART NOTE - NSCHARTNOTEFT_GEN_A_CORE
PACU ANESTHESIA ADMISSION NOTE      Procedure: cystoscopy, left ureteroscopy, left ureteral stent exchange  Post op diagnosis: left ureteral stricture    __x__  Patent Airway    _x___  Full return of protective reflexes    __x__  Full recovery from anesthesia / back to baseline status    Vitals:  T(C): 97.6 F  HR: 69  BP: 111/70  RR: 18  SpO2: 99%    Mental Status:  __x_ Awake   __x___ Alert   _____ Drowsy   _____ Sedated    Nausea/Vomiting:  _x___ NO  ______Yes,   See Post - Op Orders          Pain Scale (0-10):  _____    Treatment: ____ None    __x__ See Post - Op/PCA Orders    Post - Operative Fluids:   ____ Oral   _x__ See Post - Op Orders    Plan: Discharge:   __x__Home       _____Floor     _____Critical Care    _____  Other:_________________    Comments: uneventful anesthesia course no complications. Vitals stable. Pt transferred to PACU. discharge home when criteria is met

## 2022-04-13 NOTE — ASU DISCHARGE PLAN (ADULT/PEDIATRIC) - PROCEDURE
cystoscopy, left ureteral stent removal and ureteroscopy, retrograde ureteropyelogram and stent exchange

## 2022-04-13 NOTE — BRIEF OPERATIVE NOTE - OPERATION/FINDINGS
cystoscopy, left ureteral stricture with delayed emptying - able to pass the ureteroscope but concerned that it will re-stricture  diuretic drip encountered with open ended catheter placement   6 x 24 cm double J stent placed

## 2022-04-13 NOTE — ASU DISCHARGE PLAN (ADULT/PEDIATRIC) - NS MD DC FALL RISK RISK
For information on Fall & Injury Prevention, visit: [email or web address] OR  [email or web address] OR  https://www.cdc.gov/steadi/patient.html

## 2022-04-13 NOTE — ASU PATIENT PROFILE, ADULT - FALL HARM RISK - HARM RISK INTERVENTIONS

## 2022-04-13 NOTE — ASU PREOP CHECKLIST - PATIENT SENT TO
holding area ,[email or web address] report to ben silverio/holding area

## 2022-04-19 DIAGNOSIS — N13.1 HYDRONEPHROSIS WITH URETERAL STRICTURE, NOT ELSEWHERE CLASSIFIED: ICD-10-CM

## 2022-04-19 DIAGNOSIS — N18.9 CHRONIC KIDNEY DISEASE, UNSPECIFIED: ICD-10-CM

## 2022-04-19 DIAGNOSIS — M19.90 UNSPECIFIED OSTEOARTHRITIS, UNSPECIFIED SITE: ICD-10-CM

## 2022-04-19 DIAGNOSIS — I10 ESSENTIAL (PRIMARY) HYPERTENSION: ICD-10-CM

## 2022-04-19 DIAGNOSIS — M32.9 SYSTEMIC LUPUS ERYTHEMATOSUS, UNSPECIFIED: ICD-10-CM

## 2022-04-19 DIAGNOSIS — Z87.442 PERSONAL HISTORY OF URINARY CALCULI: ICD-10-CM

## 2022-04-19 DIAGNOSIS — Z79.82 LONG TERM (CURRENT) USE OF ASPIRIN: ICD-10-CM

## 2022-04-19 DIAGNOSIS — M06.9 RHEUMATOID ARTHRITIS, UNSPECIFIED: ICD-10-CM

## 2022-04-19 DIAGNOSIS — I73.00 RAYNAUD'S SYNDROME WITHOUT GANGRENE: ICD-10-CM

## 2022-05-03 ENCOUNTER — APPOINTMENT (OUTPATIENT)
Dept: UROLOGY | Facility: CLINIC | Age: 57
End: 2022-05-03
Payer: COMMERCIAL

## 2022-05-03 DIAGNOSIS — Z87.448 PERSONAL HISTORY OF OTHER DISEASES OF URINARY SYSTEM: ICD-10-CM

## 2022-05-03 PROCEDURE — 99215 OFFICE O/P EST HI 40 MIN: CPT

## 2022-05-03 NOTE — ADDENDUM
[FreeTextEntry1] : Patient's note was transcribed with the assistance of a medical scribe under the supervision of Dr. De La O.\par I, Dr. De La O, have reviewed the patient's chart and agree that it aligns with my medical decisions.\par Melita Magallon, our scribe, also served as a chaperone for physical examination purposes.\par \par \par

## 2022-05-03 NOTE — ASSESSMENT
[FreeTextEntry1] : BENEDICT PARRA is a 56 year old female with a Hx of SLE on cellcept, Hx of PCNL at [facility] in 2020 with subsequent hemorrhage requiring transfusion, Hx of Lt ureteroscopy laser lithotripsy with stent insertion 1/2021, at that time found to have a dense fibrosis around impacted stone with subsequent RPG demonstrating midureteral stricure sp double stent insertion 3/2022 and persistent stricture on repeat RPG 4/2022, single stent replaced. \par Patient is currently asymptomatic and kidney is mildly atrophic\par \par We discussed options including ureteroureterostomy versus endoscopic techniques such as laser endopyelotomy/balloon dilation.  First before deciding on a procedure patient will need a retrograde pyelogram and given my partners description of the short stricture I am favoring laser endopyelotomy/balloon dilation as there is a higher success rate when the stricture is short.  Patient is aware that this still has a possibility of failing and that she may require future major surgery with procedures such as a ureteroureterostomy versus reimplantation.\par Potentially higher risk of sepsis for this endoscopic procedure given patient is on immunosuppression which she is usually allowed to discontinue preoperatively.\par \par CT AP prior \par \par Our stone treatment discussion summarized--\par 1. Surveillance: we discussed risks associated with this approach including increase in size of stone, UTIs, renal obstruction.\par \par 2. URS/LL: we discussed how this is done (transurethrally with small cameras, baskets and a laser), the risks (bleeding, infection, damage to  organs, stricture, inability to access the ureter, stent pain which can be mild, moderate or severe) and benefits (minimally invasive). The patient also understands that if our scopes will not fit into the ureter because the ureter is too small, the patient will be stented and left to dilate with a stent ~2 weeks. We will then re-attempt the ureteroscopy.  We also discussed that ureteral incision with perforation is the approach used for endopyelotomy and the risk associated with this.\par \par For these treatment, we also discussed the possible need for additional therapies for persistent stone burden. \par We stressed that when ureteral stents are inserted they are temporary and must be removed within 3 months of placement. Otherwise encrustation, urosepsis, obstruction can occur.\par \par \par \par

## 2022-05-03 NOTE — HISTORY OF PRESENT ILLNESS
[FreeTextEntry1] : BENEDICT PARRA is a 56 year old female with a Hx of SLE on cellcept, Hx of PCNL at [facility] in 2020 with subsequent hemorrhage requiring transfusion, Hx of Lt ureteroscopy laser lithotripsy with stent insertion 1/2021, at that time found to have a dense fibrosis around impacted stone with subsequent RPG demonstrating midureteral stricure sp double stent insertion 3/2022 and persistent stricture on repeat RPG 4/2022, single stent replaced. \par \par Patient's ureteral stone was first detected after surveillance imaging after PCNL and was found to have silent obstruction from a Lt ureteral stone and Dr. Robison performed a Ureteroscopy Carmelo Lithotripsy 1/2021, there was extensive fibrosis around the impacted stone. \par Pt has a stent in place and she is doing well. Reports no flank pain BL. \par Denies gross hematuria, dysuria or associated symptoms. \par Pt reports Hx of SLE and she is on CellCept. States she stops the CellCept periprocedure. \par \par  PMH: PCNL at [facility] in 2020, Ureteroscopy laser lithotripsy with ureteral stent insertion. \par Family History: No  malignancies\par \par CT a/p w/o contrast images visualized from 1/2022: there is a severe Lt sided hydroureteronephrosis secondary to a 5 mm calculus in the mid Lt ureter, unchanged in position from the prior exam. The degree of hydronephrosis is worse compared to the prior exam. Unchanged capsular calcification in the interpolar region of the Lt kidney. Stable Lt renal cyst. there is interval decrease in stone burden on the Rt kidney with 4 mm calcification remaining in the upper pole. \par \par 4/2022: FLuoroscopy retrograde pyelogram images visualized: a proximate 1-1.5 cm Lt ureteral stricture is seen, unable to assess distal to the stricture. there is Lt hydronephrosis. \par \par Discussed with Dr. Robison: upon reinspection 3/2022 - there was a likely short stricture, 1 cm, did not undergo dilation or endopyelotomy, but did have 2 stents placed. single stent was exchanged 4/22/2022, as it appeared the stricture was present. \par

## 2022-05-08 NOTE — BRIEF OPERATIVE NOTE - CO SURGEON
Problem: Discharge Planning  Goal: Discharge to home or other facility with appropriate resources  5/8/2022 0107 by Rosario Malik RN  Outcome: Progressing  Flowsheets (Taken 5/7/2022 2031)  Discharge to home or other facility with appropriate resources: Identify barriers to discharge with patient and caregiver     Problem: Safety - Adult  Goal: Free from fall injury  5/8/2022 0107 by Rosario Malik RN  Outcome: Progressing     Problem: Pain  Goal: Verbalizes/displays adequate comfort level or baseline comfort level  5/8/2022 0107 by Rosario Malik RN  Outcome: Progressing     Problem: Skin/Tissue Integrity  Goal: Absence of new skin breakdown  Description: 1. Monitor for areas of redness and/or skin breakdown  2. Assess vascular access sites hourly  3. Every 4-6 hours minimum:  Change oxygen saturation probe site  4. Every 4-6 hours:  If on nasal continuous positive airway pressure, respiratory therapy assess nares and determine need for appliance change or resting period.   5/8/2022 0107 by Roasrio Malik RN  Outcome: Progressing     Problem: ABCDS Injury Assessment  Goal: Absence of physical injury  5/8/2022 0107 by Rosario Malik RN  Outcome: Progressing none

## 2022-05-23 ENCOUNTER — OUTPATIENT (OUTPATIENT)
Dept: OUTPATIENT SERVICES | Facility: HOSPITAL | Age: 57
LOS: 1 days | Discharge: HOME | End: 2022-05-23

## 2022-05-23 VITALS
RESPIRATION RATE: 16 BRPM | OXYGEN SATURATION: 97 % | SYSTOLIC BLOOD PRESSURE: 142 MMHG | HEIGHT: 64 IN | HEART RATE: 80 BPM | WEIGHT: 154.98 LBS | DIASTOLIC BLOOD PRESSURE: 80 MMHG | TEMPERATURE: 97 F

## 2022-05-23 DIAGNOSIS — Z98.890 OTHER SPECIFIED POSTPROCEDURAL STATES: Chronic | ICD-10-CM

## 2022-05-23 DIAGNOSIS — J38.1 POLYP OF VOCAL CORD AND LARYNX: Chronic | ICD-10-CM

## 2022-05-23 DIAGNOSIS — N13.5 CROSSING VESSEL AND STRICTURE OF URETER WITHOUT HYDRONEPHROSIS: ICD-10-CM

## 2022-05-23 DIAGNOSIS — Z96.0 PRESENCE OF UROGENITAL IMPLANTS: Chronic | ICD-10-CM

## 2022-05-23 DIAGNOSIS — Z01.818 ENCOUNTER FOR OTHER PREPROCEDURAL EXAMINATION: ICD-10-CM

## 2022-05-23 LAB
ALBUMIN SERPL ELPH-MCNC: 4 G/DL — SIGNIFICANT CHANGE UP (ref 3.5–5.2)
ALP SERPL-CCNC: 99 U/L — SIGNIFICANT CHANGE UP (ref 30–115)
ALT FLD-CCNC: 15 U/L — SIGNIFICANT CHANGE UP (ref 0–41)
ANION GAP SERPL CALC-SCNC: 17 MMOL/L — HIGH (ref 7–14)
APPEARANCE UR: ABNORMAL
AST SERPL-CCNC: 18 U/L — SIGNIFICANT CHANGE UP (ref 0–41)
BACTERIA # UR AUTO: NEGATIVE — SIGNIFICANT CHANGE UP
BASOPHILS # BLD AUTO: 0.03 K/UL — SIGNIFICANT CHANGE UP (ref 0–0.2)
BASOPHILS NFR BLD AUTO: 0.3 % — SIGNIFICANT CHANGE UP (ref 0–1)
BILIRUB SERPL-MCNC: 0.3 MG/DL — SIGNIFICANT CHANGE UP (ref 0.2–1.2)
BILIRUB UR-MCNC: NEGATIVE — SIGNIFICANT CHANGE UP
BUN SERPL-MCNC: 18 MG/DL — SIGNIFICANT CHANGE UP (ref 10–20)
CALCIUM SERPL-MCNC: 10 MG/DL — SIGNIFICANT CHANGE UP (ref 8.5–10.1)
CHLORIDE SERPL-SCNC: 98 MMOL/L — SIGNIFICANT CHANGE UP (ref 98–110)
CO2 SERPL-SCNC: 22 MMOL/L — SIGNIFICANT CHANGE UP (ref 17–32)
COLOR SPEC: YELLOW — SIGNIFICANT CHANGE UP
CREAT SERPL-MCNC: 0.9 MG/DL — SIGNIFICANT CHANGE UP (ref 0.7–1.5)
DIFF PNL FLD: ABNORMAL
EGFR: 75 ML/MIN/1.73M2 — SIGNIFICANT CHANGE UP
EOSINOPHIL # BLD AUTO: 0.13 K/UL — SIGNIFICANT CHANGE UP (ref 0–0.7)
EOSINOPHIL NFR BLD AUTO: 1.3 % — SIGNIFICANT CHANGE UP (ref 0–8)
EPI CELLS # UR: 0 /HPF — SIGNIFICANT CHANGE UP (ref 0–5)
GLUCOSE SERPL-MCNC: 67 MG/DL — LOW (ref 70–99)
GLUCOSE UR QL: NEGATIVE — SIGNIFICANT CHANGE UP
HCT VFR BLD CALC: 38 % — SIGNIFICANT CHANGE UP (ref 37–47)
HGB BLD-MCNC: 11.8 G/DL — LOW (ref 12–16)
HYALINE CASTS # UR AUTO: 2 /LPF — SIGNIFICANT CHANGE UP (ref 0–7)
IMM GRANULOCYTES NFR BLD AUTO: 0.3 % — SIGNIFICANT CHANGE UP (ref 0.1–0.3)
KETONES UR-MCNC: NEGATIVE — SIGNIFICANT CHANGE UP
LEUKOCYTE ESTERASE UR-ACNC: ABNORMAL
LYMPHOCYTES # BLD AUTO: 2.76 K/UL — SIGNIFICANT CHANGE UP (ref 1.2–3.4)
LYMPHOCYTES # BLD AUTO: 28 % — SIGNIFICANT CHANGE UP (ref 20.5–51.1)
MCHC RBC-ENTMCNC: 26.9 PG — LOW (ref 27–31)
MCHC RBC-ENTMCNC: 31.1 G/DL — LOW (ref 32–37)
MCV RBC AUTO: 86.6 FL — SIGNIFICANT CHANGE UP (ref 81–99)
MONOCYTES # BLD AUTO: 1.08 K/UL — HIGH (ref 0.1–0.6)
MONOCYTES NFR BLD AUTO: 11 % — HIGH (ref 1.7–9.3)
NEUTROPHILS # BLD AUTO: 5.82 K/UL — SIGNIFICANT CHANGE UP (ref 1.4–6.5)
NEUTROPHILS NFR BLD AUTO: 59.1 % — SIGNIFICANT CHANGE UP (ref 42.2–75.2)
NITRITE UR-MCNC: NEGATIVE — SIGNIFICANT CHANGE UP
NRBC # BLD: 0 /100 WBCS — SIGNIFICANT CHANGE UP (ref 0–0)
PH UR: 7 — SIGNIFICANT CHANGE UP (ref 5–8)
PLATELET # BLD AUTO: 359 K/UL — SIGNIFICANT CHANGE UP (ref 130–400)
POTASSIUM SERPL-MCNC: 5 MMOL/L — SIGNIFICANT CHANGE UP (ref 3.5–5)
POTASSIUM SERPL-SCNC: 5 MMOL/L — SIGNIFICANT CHANGE UP (ref 3.5–5)
PROT SERPL-MCNC: 7.4 G/DL — SIGNIFICANT CHANGE UP (ref 6–8)
PROT UR-MCNC: ABNORMAL
RBC # BLD: 4.39 M/UL — SIGNIFICANT CHANGE UP (ref 4.2–5.4)
RBC # FLD: 14.5 % — SIGNIFICANT CHANGE UP (ref 11.5–14.5)
RBC CASTS # UR COMP ASSIST: 69 /HPF — HIGH (ref 0–4)
SODIUM SERPL-SCNC: 137 MMOL/L — SIGNIFICANT CHANGE UP (ref 135–146)
SP GR SPEC: 1.01 — SIGNIFICANT CHANGE UP (ref 1.01–1.03)
UROBILINOGEN FLD QL: SIGNIFICANT CHANGE UP
WBC # BLD: 9.85 K/UL — SIGNIFICANT CHANGE UP (ref 4.8–10.8)
WBC # FLD AUTO: 9.85 K/UL — SIGNIFICANT CHANGE UP (ref 4.8–10.8)
WBC UR QL: 563 /HPF — HIGH (ref 0–5)

## 2022-05-23 NOTE — H&P PST ADULT - NSICDXPASTMEDICALHX_GEN_ALL_CORE_FT
PAST MEDICAL HISTORY:  Arthritis     Cataracts, bilateral REMOVED WITH IOLI    Chronic kidney disease (CKD)     [place] (hard of hearing)     HTN (hypertension)     Lupus     Raynauds disease     Renal calculi     Rheumatoid arthritis     Scarring of lung ? d/t Lupus

## 2022-05-23 NOTE — H&P PST ADULT - HISTORY OF PRESENT ILLNESS
56yr old female states " I had so many of these surgeries before, they found scar tissue on LT ureter in 2/2022, they wanted to do Robotic surgery to snip and reattach but " he wants to do laser first and see if it works" s/p CYSTO and diagnostic ureteroscopy for LT ureteral stricture 2/2022. Is now scheduled for  CYSTOSCOPY, LEFT URETEROSCOPY, RETROGRADE PYELOGRAM, ENDOPYELOTOMY, URETERAL STENT EXCHANGE on 6/3. Denies COVID                                                                                                                            S/S. Did not receive the vaccine. Verbalized understanding of COVID prevention measures. Exercise anabelle 1 FOS LTD by fatigue and SOB.                                                                                      Anesthesia Alert                                     YES--Difficult Airway       NO--History of neck surgery or radiation  NO--Limited ROM of neck  NO--History of Malignant hyperthermia                     NO--Personal or family history of Pseudocholinesterase deficiency  NO--Prior Anesthesia Complication  NO--Latex Allergy  NO--Loose teeth  NO--History of Rheumatoid Arthritis  NO--CLAUDIA  No Bleeding risk  YES [place]-

## 2022-05-23 NOTE — H&P PST ADULT - NSICDXPASTSURGICALHX_GEN_ALL_CORE_FT
PAST SURGICAL HISTORY:  H/O colonoscopy 2018    H/O lithotripsy eswal    History of D&C     History of surgery renal biopsy    History of surgery B/L IOL    Laryngeal polyp     S/P cystoscopy with ureteral stent placement 2/2022

## 2022-05-26 ENCOUNTER — NON-APPOINTMENT (OUTPATIENT)
Age: 57
End: 2022-05-26

## 2022-05-27 ENCOUNTER — NON-APPOINTMENT (OUTPATIENT)
Age: 57
End: 2022-05-27

## 2022-05-31 ENCOUNTER — NON-APPOINTMENT (OUTPATIENT)
Age: 57
End: 2022-05-31

## 2022-05-31 ENCOUNTER — LABORATORY RESULT (OUTPATIENT)
Age: 57
End: 2022-05-31

## 2022-06-03 ENCOUNTER — APPOINTMENT (OUTPATIENT)
Dept: UROLOGY | Facility: HOSPITAL | Age: 57
End: 2022-06-03

## 2022-06-03 ENCOUNTER — OUTPATIENT (OUTPATIENT)
Dept: OUTPATIENT SERVICES | Facility: HOSPITAL | Age: 57
LOS: 1 days | Discharge: HOME | End: 2022-06-03
Payer: MEDICARE

## 2022-06-03 ENCOUNTER — TRANSCRIPTION ENCOUNTER (OUTPATIENT)
Age: 57
End: 2022-06-03

## 2022-06-03 VITALS
HEART RATE: 69 BPM | DIASTOLIC BLOOD PRESSURE: 84 MMHG | TEMPERATURE: 97 F | RESPIRATION RATE: 17 BRPM | SYSTOLIC BLOOD PRESSURE: 160 MMHG | HEIGHT: 62 IN | WEIGHT: 153 LBS | OXYGEN SATURATION: 100 %

## 2022-06-03 VITALS — HEART RATE: 84 BPM | SYSTOLIC BLOOD PRESSURE: 166 MMHG | RESPIRATION RATE: 18 BRPM | DIASTOLIC BLOOD PRESSURE: 86 MMHG

## 2022-06-03 DIAGNOSIS — Z98.890 OTHER SPECIFIED POSTPROCEDURAL STATES: Chronic | ICD-10-CM

## 2022-06-03 DIAGNOSIS — Z96.0 PRESENCE OF UROGENITAL IMPLANTS: Chronic | ICD-10-CM

## 2022-06-03 DIAGNOSIS — J38.1 POLYP OF VOCAL CORD AND LARYNX: Chronic | ICD-10-CM

## 2022-06-03 PROCEDURE — 74420 UROGRAPHY RTRGR +-KUB: CPT | Mod: 26,LT

## 2022-06-03 PROCEDURE — 52344 CYSTO/URETERO STRICTURE TX: CPT | Mod: LT

## 2022-06-03 PROCEDURE — 52332 CYSTOSCOPY AND TREATMENT: CPT | Mod: LT

## 2022-06-03 RX ORDER — SODIUM CHLORIDE 9 MG/ML
1000 INJECTION, SOLUTION INTRAVENOUS
Refills: 0 | Status: DISCONTINUED | OUTPATIENT
Start: 2022-06-03 | End: 2022-06-03

## 2022-06-03 RX ADMIN — SODIUM CHLORIDE 70 MILLILITER(S): 9 INJECTION, SOLUTION INTRAVENOUS at 14:07

## 2022-06-03 NOTE — ASU DISCHARGE PLAN (ADULT/PEDIATRIC) - ASU DC SPECIAL INSTRUCTIONSFT
You had a ureteral stent placed in your ureter to help keep the ureter open post operatively. You can take ibuprofen (advil/motrin) and add tylenol as needed for pain control.   Please complete the antibiotic course which was previously prescribed to your pharmacy. Save one antibiotic tablet for the day of stent removal. Please note that the stent is temporary and must be removed.  's office will call you for a follow up appointment.

## 2022-06-03 NOTE — ASU PATIENT PROFILE, ADULT - FALL HARM RISK - UNIVERSAL INTERVENTIONS
Bed in lowest position, wheels locked, appropriate side rails in place/Call bell, personal items and telephone in reach/Instruct patient to call for assistance before getting out of bed or chair/Non-slip footwear when patient is out of bed/[place] to call system/Physically safe environment - no spills, clutter or unnecessary equipment/Purposeful Proactive Rounding/Room/bathroom lighting operational, light cord in reach

## 2022-06-03 NOTE — ASU DISCHARGE PLAN (ADULT/PEDIATRIC) - NS MD DC FALL RISK RISK
For information on Fall & Injury Prevention, visit: [email or web address] OR  [email or web address] OR  https://www.cdc.gov/steadi/patient.html

## 2022-06-03 NOTE — ASU DISCHARGE PLAN (ADULT/PEDIATRIC) - PROCEDURE
left ureteroscopy, laser endopyelotomy, balloon dilation of ureteral stricture, ureteral stent exchange

## 2022-06-03 NOTE — ASU PATIENT PROFILE, ADULT - NSICDXPASTMEDICALHX_GEN_ALL_CORE_FT
PAST MEDICAL HISTORY:  Arthritis     Cataracts, bilateral REMOVED WITH IOLI    Chronic kidney disease (CKD)     [place] (hard of hearing)     HTN (hypertension)     Lupus     Raynauds disease     Renal calculi     Rheumatoid arthritis     Scarring of lung ? d/t Lupus

## 2022-06-04 PROBLEM — H91.90 UNSPECIFIED HEARING LOSS, UNSPECIFIED EAR: Chronic | Status: ACTIVE | Noted: 2022-05-23

## 2022-06-06 DIAGNOSIS — Z79.82 LONG TERM (CURRENT) USE OF ASPIRIN: ICD-10-CM

## 2022-06-06 DIAGNOSIS — N18.9 CHRONIC KIDNEY DISEASE, UNSPECIFIED: ICD-10-CM

## 2022-06-06 DIAGNOSIS — N13.5 CROSSING VESSEL AND STRICTURE OF URETER WITHOUT HYDRONEPHROSIS: ICD-10-CM

## 2022-06-06 DIAGNOSIS — M19.90 UNSPECIFIED OSTEOARTHRITIS, UNSPECIFIED SITE: ICD-10-CM

## 2022-06-06 DIAGNOSIS — H91.90 UNSPECIFIED HEARING LOSS, UNSPECIFIED EAR: ICD-10-CM

## 2022-06-06 DIAGNOSIS — I12.9 HYPERTENSIVE CHRONIC KIDNEY DISEASE WITH STAGE 1 THROUGH STAGE 4 CHRONIC KIDNEY DISEASE, OR UNSPECIFIED CHRONIC KIDNEY DISEASE: ICD-10-CM

## 2022-06-06 DIAGNOSIS — M06.9 RHEUMATOID ARTHRITIS, UNSPECIFIED: ICD-10-CM

## 2022-06-06 DIAGNOSIS — I73.00 RAYNAUD'S SYNDROME WITHOUT GANGRENE: ICD-10-CM

## 2022-06-06 DIAGNOSIS — M32.9 SYSTEMIC LUPUS ERYTHEMATOSUS, UNSPECIFIED: ICD-10-CM

## 2022-06-21 ENCOUNTER — APPOINTMENT (OUTPATIENT)
Dept: UROLOGY | Facility: CLINIC | Age: 57
End: 2022-06-21
Payer: COMMERCIAL

## 2022-06-21 PROCEDURE — 99214 OFFICE O/P EST MOD 30 MIN: CPT | Mod: 25

## 2022-06-21 PROCEDURE — 52315 CYSTOSCOPY AND TREATMENT: CPT

## 2022-06-21 NOTE — HISTORY OF PRESENT ILLNESS
[FreeTextEntry1] : BENEDICT PARRA is a 56 year old female with a Hx of SLE on cellcept, Hx of PCNL at [facility] in 2020 with subsequent hemorrhage requiring transfusion, Hx of Lt ureteroscopy laser lithotripsy with stent insertion 1/2021, at that time found to have a dense fibrosis around impacted stone with subsequent RPG demonstrating midureteral stricure sp double stent insertion 3/2022 and persistent stricture on repeat RPG 4/2022, single stent replaced. \par Patient is currently asymptomatic and kidney is mildly atrophic\par sp L ureteroscopy, laser endopyelotomy with balloon dilation, ureteral stent exchange, RPG. stricture starts at top of sacrum at start of false pelvis down 2 -2.5 cm. non obliterative\par \par Pt reports that they took Abx prior to procedure\par Fluoro images visualized showing 2.5 cm stricture w patient \par \par previously \par Patient's ureteral stone was first detected after surveillance imaging after PCNL and was found to have silent obstruction from a Lt ureteral stone and Dr. Robison performed a Ureteroscopy Carmelo Lithotripsy 1/2021, there was extensive fibrosis around the impacted stone. \par Pt has a stent in place and she is doing well. Reports no flank pain BL. \par Denies gross hematuria, dysuria or associated symptoms. \par Pt reports Hx of SLE and she is on CellCept. States she stops the CellCept periprocedure. \par \par  PMH: PCNL at [facility] in 2020, Ureteroscopy laser lithotripsy with ureteral stent insertion. \par Family History: No  malignancies\par \par CT a/p w/o contrast images visualized from 1/2022: there is a severe Lt sided hydroureteronephrosis secondary to a 5 mm calculus in the mid Lt ureter, unchanged in position from the prior exam. The degree of hydronephrosis is worse compared to the prior exam. Unchanged capsular calcification in the interpolar region of the Lt kidney. Stable Lt renal cyst. there is interval decrease in stone burden on the Rt kidney with 4 mm calcification remaining in the upper pole. \par \par 4/2022: FLuoroscopy retrograde pyelogram images visualized: a proximate 1-1.5 cm Lt ureteral stricture is seen, unable to assess distal to the stricture. there is Lt hydronephrosis. \par \par Discussed with Dr. Robison: upon reinspection 3/2022 - there was a likely short stricture, 1 cm, did not undergo dilation or endopyelotomy, but did have 2 stents placed. single stent was exchanged 4/22/2022, as it appeared the stricture was present. \par

## 2022-06-21 NOTE — ASSESSMENT
[FreeTextEntry1] : BENEDICT PARRA is a 56 year old female with a Hx of SLE on cellcept, Hx of PCNL at [facility] in 2020 with subsequent hemorrhage requiring transfusion, Hx of Lt ureteroscopy laser lithotripsy with stent insertion 1/2021, at that time found to have a dense fibrosis around impacted stone with subsequent RPG demonstrating midureteral stricure sp double stent insertion 3/2022 and persistent stricture on repeat RPG 4/2022, single stent replaced. \par Patient is currently asymptomatic and kidney is mildly atrophic\par sp L ureteroscopy, laser endopyelotomy with balloon dilation, ureteral stent exchange, RPG. stricture starts at top of sacrum at start of false pelvis down 2 -2.5 cm. non obliterative\par \par Follow up in 2 month with Ultrasound and renal scan done a week prior .\par if fails this procedure, good candidate for buccal. U-U , may be too challenging if ends far apart

## 2022-06-21 NOTE — ADDENDUM
[FreeTextEntry1] : Patient's note was transcribed with the assistance of a medical scribe under the supervision of Dr. De La O.\par I, Dr. De La O, have reviewed the patient's chart and agree that it aligns with my medical decisions.\par Magui Hurt, our scribe, also served as a chaperone for physical examination purposes. and stent removal purposes\par \par \par

## 2022-09-20 ENCOUNTER — APPOINTMENT (OUTPATIENT)
Dept: UROLOGY | Facility: CLINIC | Age: 57
End: 2022-09-20

## 2022-09-20 PROCEDURE — 99215 OFFICE O/P EST HI 40 MIN: CPT

## 2022-09-20 NOTE — ASSESSMENT
[FreeTextEntry1] : BENEDICT PARRA is a 56 year old female with a Hx of SLE on cellcept, Hx of PCNL at [facility] in 2020 with subsequent hemorrhage requiring transfusion, Hx of Lt ureteroscopy laser lithotripsy with stent insertion 1/2021, at that time found to have a dense fibrosis around impacted stone with subsequent RPG demonstrating midureteral stricture sp double stent insertion 3/2022 and persistent stricture on repeat RPG 4/2022, single stent replaced. \par Patient is currently asymptomatic and kidney is mildly atrophic.S/P L ureteroscopy, laser endopyelotomy with balloon dilation, ureteral stent exchange, RPG. stricture starts at top of sacrum at start of false pelvis down 2 -2.5 cm. non obliterative\par \par Patient elects undergo left robotic ureteroplasty with buccal graft.  We discussed that a ureteroureterostomy is unlikely to work in this scenario as the ends are too far apart.  The stricture is also in the mid ureter and other options such as ureteral reimplantation +/- Psoas Hitch +/- Boari flap may prove challenging as the stricture is fairly high.\par Informed Dr Mcginnis of ENT about this case and he will assist with harvesting the buccal graft.\par Pt will undergo cystoscopy with left retrograde pyelogram prior, possible ureteral stent insertion in order to confirm the length of stricture now post endopyelotomy\par \par \par We discussed the risk risk of urinoma/leak, bleeding, infection, recurrence of stricture.  We discussed risk of any major surgery, including but not limited to MI, CVA, DVT, infection, blood transfusion, wound healing problems, injury to surrounding structures (i.e. including but not limited to bowel or other adjacent organs), positioning injuries. We also discussed alternative including surveillance, as well as long-term ureteral stent exchanges and risks associated with this. And we discussed endoscopic techniques such as repeat endopyelotomy. In summary, we discussed the procedure, risks, potential benefits, and reasonable alternatives. All questions were answered.  \par I asked this patient to call if any additional questions or concerns and encouraged her to seek a second opinion if he wished.  Regarding the risks of the buccal graft, we will discuss this further with ENT prior to the surgery.\par We discussed that buccal graft ureteroplasty is a newer technique for the treatment of mid ureteral strictures.\par \par

## 2022-09-20 NOTE — HISTORY OF PRESENT ILLNESS
[FreeTextEntry1] : BENEDICT PARRA is a 56 year old female with a Hx of SLE on cellcept, Hx of PCNL at [facility] in 2020 with subsequent hemorrhage requiring transfusion, Hx of Lt ureteroscopy laser lithotripsy with stent insertion 1/2021, at that time found to have a dense fibrosis around impacted stone with subsequent RPG demonstrating midureteral stricure sp double stent insertion 3/2022 and persistent stricture on repeat RPG 4/2022, single stent replaced. \par Patient is currently asymptomatic and kidney is mildly atrophic\par sp L ureteroscopy, laser endopyelotomy with balloon dilation, ureteral stent exchange, RPG. stricture starts at top of sacrum at start of false pelvis down 2 -2.5 cm. non obliterative\par \par Pt reports no pain , no gross hematuria or dysuria as well as no fever , chills and Flank pain. \par \par RBUS 08/16/22 images visualized - \par Moderate left hydroureteronephrosis , slightly increased from 05/31/22 . The stent that was present on 05/31/22 is no longer identified . Benign bilateral renal cysts .Unremarkable sonographic appearance of the urinary blader. No significant PVR noted and no stones present \par \par NM renogram with diuretic images visualized 08/16/22 -Diminished left renal perfusion in the left kidney with evidence of obstruction. Normal perfusion and function in the right kidney without evidence of obstruction. split renal function - 23 % left , right 57 % \par \par previously \par Patient's ureteral stone was first detected after surveillance imaging after PCNL and was found to have silent obstruction from a Lt ureteral stone and Dr. Robison performed a Ureteroscopy Carmelo Lithotripsy 1/2021, there was extensive fibrosis around the impacted stone. \par Pt has a stent in place and she is doing well. Reports no flank pain BL. \par Denies gross hematuria, dysuria or associated symptoms. \par Pt reports Hx of SLE and she is on CellCept. States she stops the CellCept periprocedure. \par \par  PMH: PCNL at [facility] in 2020, Ureteroscopy laser lithotripsy with ureteral stent insertion. \par Family History: No  malignancies\par \par CT a/p w/o contrast images visualized from 1/2022: there is a severe Lt sided hydroureteronephrosis secondary to a 5 mm calculus in the mid Lt ureter, unchanged in position from the prior exam. The degree of hydronephrosis is worse compared to the prior exam. Unchanged capsular calcification in the interpolar region of the Lt kidney. Stable Lt renal cyst. there is interval decrease in stone burden on the Rt kidney with 4 mm calcification remaining in the upper pole. \par \par 4/2022: FLuoroscopy retrograde pyelogram images visualized: a proximate 1-1.5 cm Lt ureteral stricture is seen, unable to assess distal to the stricture. there is Lt hydronephrosis. \par \par Discussed with Dr. Robison: upon reinspection 3/2022 - there was a likely short stricture, 1 cm, did not undergo dilation or endopyelotomy, but did have 2 stents placed. single stent was exchanged 4/22/2022, as it appeared the stricture was present. \par

## 2022-09-20 NOTE — ADDENDUM
[FreeTextEntry1] : Patient's note was transcribed with the assistance of a medical scribe under the supervision of Dr. De La O.\par I, Dr. De La O, have reviewed the patient's chart and agree that it aligns with my medical decisions.\par Magui Christine, our scribe, also served as a chaperone for physical examination purposes.\par \par \par

## 2022-10-10 NOTE — ASU PATIENT PROFILE, ADULT - AS SC BRADEN ACTIVITY
FOLLOW UP VISIT    Chief Complaint   Patient presents with    Knee Pain     BILATERAL KNEE       HPI:    Ricardo Del Cid is a 67 y.o. female who presents for follow-up evaluation of bilateral knee pain. At the last visit on 9/12/2022, the patient was presenting for reevaluation for bilateral knee pain. She had done well after the intra-articular steroid injections on 6/20/2022. We discussed authorization for viscosupplementation. Since the last visit, Ricardo Del Cid has noted no particularly worsening symptoms. She states that she is very fearful of the pain getting so bad, to the point where I initially saw the patient on the initial visit on 6/20/2022. She really would like to hold off on the viscosupplementation at this time. She has lots of questions today about definitive management and expectant management for her underlying bilateral knee pain. Medical History  Patient's medications, allergies, past medical, surgical, social, and family histories were reviewed and updated as appropriate. Physical Examination:  General: Well appearing female, in no acute distress  Respiratory: Normal respiratory effort  Cardiovascular: No visual or palpable edema  Skin: no identified rashes, no induration, erythema or cyanosis  Neurologic: Light touch sensation is intact, no allodynia or hyperalgesia  Gait: Normal gait and station  Extremities: No evidence of clubbing, cyanosis, tenosynovitis or nail pitting  MSK:  Bilateral knees  Inspection/Palpation: no swelling, no skin changes  ROM: normal ROM    Radiology: no new imaging    Assessment/Treatment Plan: Ricardo Del Cid is a 67 y.o. female with:    1. Left knee pain, unspecified chronicity  2. Primary osteoarthritis of left knee  3. Right knee pain, unspecified chronicity  4. Primary osteoarthritis of right knee    Patient was seen and examined in the clinic today. She is presenting for follow-up evaluation of bilateral knee pain.   She has lots of questions today including expectant management and definitive fixation of her bilateral knee pain. She is very happy with her progress after the intra-articular steroid injections. She states that she is actually doing pretty well and she does not think that she needs any repeat injections today. She would really like to hold off on viscosupplementation. We discussed that definitive fixation includes bilateral total knee replacements. We discussed orthopedic surgeons. Ultimately, she would like to follow-up with Dr. Carlos Albert, to discuss surgical management. If Dr. Carlos Albert does not want to perform definitive surgical management at this time, I did discuss with the patient that we could do repeat intra-articular steroid injections every 4 to 6 months. I also discussed that I think that she would probably do well with viscosupplementation. I will follow the patient very closely over the next several months to determine best avenues of management. At this time, we will hold off on any injections today. Patient is very happy with this plan. No orders of the defined types were placed in this encounter. Follow-up:   Return if symptoms worsen or fail to improve. Sooner with any problems, questions, concerns, or worsening symptoms. Electronically signed by Jillian Curry MD on 10/10/2022 at 11:54 AM.      Disclaimer: This note was dictated with voice recognition software. Though review and correction are routine, we apologize for any errors. (4) walks frequently

## 2022-10-19 ENCOUNTER — OUTPATIENT (OUTPATIENT)
Dept: OUTPATIENT SERVICES | Facility: HOSPITAL | Age: 57
LOS: 1 days | Discharge: HOME | End: 2022-10-19

## 2022-10-19 VITALS
RESPIRATION RATE: 16 BRPM | DIASTOLIC BLOOD PRESSURE: 91 MMHG | WEIGHT: 154.32 LBS | HEIGHT: 64 IN | TEMPERATURE: 97 F | HEART RATE: 58 BPM | SYSTOLIC BLOOD PRESSURE: 163 MMHG | OXYGEN SATURATION: 99 %

## 2022-10-19 DIAGNOSIS — Z96.0 PRESENCE OF UROGENITAL IMPLANTS: Chronic | ICD-10-CM

## 2022-10-19 DIAGNOSIS — Z98.890 OTHER SPECIFIED POSTPROCEDURAL STATES: Chronic | ICD-10-CM

## 2022-10-19 DIAGNOSIS — Z01.818 ENCOUNTER FOR OTHER PREPROCEDURAL EXAMINATION: ICD-10-CM

## 2022-10-19 DIAGNOSIS — N13.5 CROSSING VESSEL AND STRICTURE OF URETER WITHOUT HYDRONEPHROSIS: ICD-10-CM

## 2022-10-19 DIAGNOSIS — J38.1 POLYP OF VOCAL CORD AND LARYNX: Chronic | ICD-10-CM

## 2022-10-19 LAB
ALBUMIN SERPL ELPH-MCNC: 4.5 G/DL — SIGNIFICANT CHANGE UP (ref 3.5–5.2)
ALP SERPL-CCNC: 99 U/L — SIGNIFICANT CHANGE UP (ref 30–115)
ALT FLD-CCNC: 14 U/L — SIGNIFICANT CHANGE UP (ref 0–41)
ANION GAP SERPL CALC-SCNC: 10 MMOL/L — SIGNIFICANT CHANGE UP (ref 7–14)
APPEARANCE UR: CLEAR — SIGNIFICANT CHANGE UP
APTT BLD: 31.5 SEC — SIGNIFICANT CHANGE UP (ref 27–39.2)
AST SERPL-CCNC: 19 U/L — SIGNIFICANT CHANGE UP (ref 0–41)
BACTERIA # UR AUTO: NEGATIVE — SIGNIFICANT CHANGE UP
BASOPHILS # BLD AUTO: 0.04 K/UL — SIGNIFICANT CHANGE UP (ref 0–0.2)
BASOPHILS NFR BLD AUTO: 0.8 % — SIGNIFICANT CHANGE UP (ref 0–1)
BILIRUB SERPL-MCNC: 0.2 MG/DL — SIGNIFICANT CHANGE UP (ref 0.2–1.2)
BILIRUB UR-MCNC: NEGATIVE — SIGNIFICANT CHANGE UP
BLD GP AB SCN SERPL QL: SIGNIFICANT CHANGE UP
BUN SERPL-MCNC: 21 MG/DL — HIGH (ref 10–20)
CALCIUM SERPL-MCNC: 10.1 MG/DL — SIGNIFICANT CHANGE UP (ref 8.4–10.5)
CHLORIDE SERPL-SCNC: 108 MMOL/L — SIGNIFICANT CHANGE UP (ref 98–110)
CO2 SERPL-SCNC: 25 MMOL/L — SIGNIFICANT CHANGE UP (ref 17–32)
COLOR SPEC: SIGNIFICANT CHANGE UP
CREAT SERPL-MCNC: 0.9 MG/DL — SIGNIFICANT CHANGE UP (ref 0.7–1.5)
DIFF PNL FLD: NEGATIVE — SIGNIFICANT CHANGE UP
EGFR: 75 ML/MIN/1.73M2 — SIGNIFICANT CHANGE UP
EOSINOPHIL # BLD AUTO: 0.13 K/UL — SIGNIFICANT CHANGE UP (ref 0–0.7)
EOSINOPHIL NFR BLD AUTO: 2.5 % — SIGNIFICANT CHANGE UP (ref 0–8)
EPI CELLS # UR: 1 /HPF — SIGNIFICANT CHANGE UP (ref 0–5)
GLUCOSE SERPL-MCNC: 94 MG/DL — SIGNIFICANT CHANGE UP (ref 70–99)
GLUCOSE UR QL: NEGATIVE — SIGNIFICANT CHANGE UP
HCT VFR BLD CALC: 42.7 % — SIGNIFICANT CHANGE UP (ref 37–47)
HGB BLD-MCNC: 13.5 G/DL — SIGNIFICANT CHANGE UP (ref 12–16)
HYALINE CASTS # UR AUTO: 0 /LPF — SIGNIFICANT CHANGE UP (ref 0–7)
IMM GRANULOCYTES NFR BLD AUTO: 0.4 % — HIGH (ref 0.1–0.3)
INR BLD: 0.86 RATIO — SIGNIFICANT CHANGE UP (ref 0.65–1.3)
KETONES UR-MCNC: NEGATIVE — SIGNIFICANT CHANGE UP
LEUKOCYTE ESTERASE UR-ACNC: NEGATIVE — SIGNIFICANT CHANGE UP
LYMPHOCYTES # BLD AUTO: 2.13 K/UL — SIGNIFICANT CHANGE UP (ref 1.2–3.4)
LYMPHOCYTES # BLD AUTO: 41 % — SIGNIFICANT CHANGE UP (ref 20.5–51.1)
MCHC RBC-ENTMCNC: 25.9 PG — LOW (ref 27–31)
MCHC RBC-ENTMCNC: 31.6 G/DL — LOW (ref 32–37)
MCV RBC AUTO: 82 FL — SIGNIFICANT CHANGE UP (ref 81–99)
MONOCYTES # BLD AUTO: 0.66 K/UL — HIGH (ref 0.1–0.6)
MONOCYTES NFR BLD AUTO: 12.7 % — HIGH (ref 1.7–9.3)
NEUTROPHILS # BLD AUTO: 2.21 K/UL — SIGNIFICANT CHANGE UP (ref 1.4–6.5)
NEUTROPHILS NFR BLD AUTO: 42.6 % — SIGNIFICANT CHANGE UP (ref 42.2–75.2)
NITRITE UR-MCNC: NEGATIVE — SIGNIFICANT CHANGE UP
NRBC # BLD: 0 /100 WBCS — SIGNIFICANT CHANGE UP (ref 0–0)
PH UR: 6.5 — SIGNIFICANT CHANGE UP (ref 5–8)
PLATELET # BLD AUTO: 240 K/UL — SIGNIFICANT CHANGE UP (ref 130–400)
POTASSIUM SERPL-MCNC: 5.1 MMOL/L — HIGH (ref 3.5–5)
POTASSIUM SERPL-SCNC: 5.1 MMOL/L — HIGH (ref 3.5–5)
PROT SERPL-MCNC: 7.3 G/DL — SIGNIFICANT CHANGE UP (ref 6–8)
PROT UR-MCNC: ABNORMAL
PROTHROM AB SERPL-ACNC: 9.7 SEC — LOW (ref 9.95–12.87)
RBC # BLD: 5.21 M/UL — SIGNIFICANT CHANGE UP (ref 4.2–5.4)
RBC # FLD: 16.2 % — HIGH (ref 11.5–14.5)
RBC CASTS # UR COMP ASSIST: 1 /HPF — SIGNIFICANT CHANGE UP (ref 0–4)
SODIUM SERPL-SCNC: 143 MMOL/L — SIGNIFICANT CHANGE UP (ref 135–146)
SP GR SPEC: 1.02 — SIGNIFICANT CHANGE UP (ref 1.01–1.03)
UROBILINOGEN FLD QL: SIGNIFICANT CHANGE UP
WBC # BLD: 5.19 K/UL — SIGNIFICANT CHANGE UP (ref 4.8–10.8)
WBC # FLD AUTO: 5.19 K/UL — SIGNIFICANT CHANGE UP (ref 4.8–10.8)
WBC UR QL: 1 /HPF — SIGNIFICANT CHANGE UP (ref 0–5)

## 2022-10-19 PROCEDURE — 93010 ELECTROCARDIOGRAM REPORT: CPT

## 2022-10-19 NOTE — H&P PST ADULT - NSANTHOSAYNRD_GEN_A_CORE
negative - no nasal congestion
No. CLAUDIA screening performed.  STOP BANG Legend: 0-2 = LOW Risk; 3-4 = INTERMEDIATE Risk; 5-8 = HIGH Risk

## 2022-10-19 NOTE — H&P PST ADULT - MUSCULOSKELETAL
normal/ROM intact/no joint swelling/normal gait/strength 5/5 bilateral upper extremities/strength 5/5 bilateral lower extremities

## 2022-10-19 NOTE — H&P PST ADULT - HISTORY OF PRESENT ILLNESS
56 year old female here for above procedures, has stricture in left ureter, has stent in same, pt states she gets frequent UTIs  pt denies cp, sob, no olson until 2 fos or more  pt denies urinary frequency, urgency or burning, no jocelyn hematuria, nocturia x 2  pt denies current s/s of uti   ex anabelle 2 fos    Anesthesia Alert  NO--Difficult Airway  NO--History of neck surgery or radiation  NO--Limited ROM of neck  NO--History of Malignant hyperthermia  NO--Personal or family history of Pseudocholinesterase deficiency  NO--Prior Anesthesia Complication  NO--Latex Allergy  NO--Loose teeth  NO--History of Rheumatoid Arthritis  HX of SLE  NO--CLAUDIA  +Bleeding risks  Had Hypotensive episode with anesthesia one time (with blood loss)    Pt denies any s/s covid 19 and reports no contact with known positive people. Pt has appointment for repeat covid testing pre op and instructed to continue to self monitor and report any concerns to MD. Pt will continue to practice self isolation and  exposure control measures pre op  pt advised she will need COVID testing 3 days prior to each surgery, pt states understanding     56 year old female here for above procedures, has stricture in left ureter, has stent in same, pt states she gets frequent UTIs  pt denies cp, sob, no olson until 2 fos or more  pt denies urinary frequency, urgency or burning, no jocelyn hematuria, nocturia x 2  pt denies current s/s of uti   ex anabelle 2 fos    Anesthesia Alert  NO--Difficult Airway  NO--History of neck surgery or radiation  NO--Limited ROM of neck  NO--History of Malignant hyperthermia  NO--Personal or family history of Pseudocholinesterase deficiency  NO--Prior Anesthesia Complication  NO--Latex Allergy  NO--Loose teeth  HAS + History of Rheumatoid Arthritis  HX of SLE  NO--CLAUDIA  +Bleeding risks  Had Hypotensive episode with anesthesia one time (with blood loss)    Pt denies any s/s covid 19 and reports no contact with known positive people. Pt has appointment for repeat covid testing pre op and instructed to continue to self monitor and report any concerns to MD. Pt will continue to practice self isolation and  exposure control measures pre op  pt advised she will need COVID testing 3 days prior to each surgery, pt states understanding

## 2022-10-19 NOTE — H&P PST ADULT - NSICDXPASTMEDICALHX_GEN_ALL_CORE_FT
PAST MEDICAL HISTORY:  Arthritis Rheumatoid    Cataracts, bilateral REMOVED WITH IOLI    Chronic kidney disease (CKD)     [place] (hard of hearing)     HTN (hypertension)     Lupus     Raynauds disease     Renal calculi     Rheumatoid arthritis     Scarring of lung ? d/t Lupus

## 2022-10-19 NOTE — H&P PST ADULT - REASON FOR ADMISSION
1. cystoscopy left retrograde pyleogram, left ureteral stent placement  2. left robotic ureteroplasty with buccal graft

## 2022-10-20 ENCOUNTER — APPOINTMENT (OUTPATIENT)
Dept: OTOLARYNGOLOGY | Facility: CLINIC | Age: 57
End: 2022-10-20

## 2022-10-20 VITALS — HEIGHT: 64 IN | BODY MASS INDEX: 26.12 KG/M2 | WEIGHT: 153 LBS

## 2022-10-20 PROCEDURE — 99204 OFFICE O/P NEW MOD 45 MIN: CPT

## 2022-10-20 NOTE — HISTORY OF PRESENT ILLNESS
[de-identified] : Patient presents today c/o surgery consult . She was  referred by Dr. De La O ,  Will be having joint  surgery performed .  Left robotic urethroplasty with buccal graft. Here to discuss surgery . \par

## 2022-10-20 NOTE — PHYSICAL EXAM
[Midline] : trachea located in midline position [de-identified] : dental amalgam tattoo right buccal mucosa. left normal [Normal] : no rashes

## 2022-10-20 NOTE — ASSESSMENT
[FreeTextEntry1] : Discussed buccal mucosa graft\par Plan for left buccal mucosa as first option considering amalgam tattoo on right\par Risks discussed including trismus, bleeding, pain, facial nerve injury, salivary duct injury, delayed healing due to immunosuppression (on CellCept for SLE)\par Surgery date scheduled with Dr. De La O

## 2022-10-21 PROBLEM — M19.90 UNSPECIFIED OSTEOARTHRITIS, UNSPECIFIED SITE: Chronic | Status: ACTIVE | Noted: 2019-05-28

## 2022-10-21 LAB
CULTURE RESULTS: SIGNIFICANT CHANGE UP
SPECIMEN SOURCE: SIGNIFICANT CHANGE UP

## 2022-10-25 ENCOUNTER — LABORATORY RESULT (OUTPATIENT)
Age: 57
End: 2022-10-25

## 2022-10-27 NOTE — ASU PATIENT PROFILE, ADULT - PATIENT'S PREFERRED PRONOUN
Chief Complaint   Patient presents with   • Pre-Op Exam     Shell Dos 12.22.2021 left foot bunionectomy          PREOPERATIVE HISTORY AND PHYSICAL     REQUESTING PHYSICIAN    Dr. Brian Goff    PRIMARY CARE PHYSICIAN    Kajal Moctezuma MD    HISTORY OF PRESENT ILLNESS    This patient was seen at the request of Dr. Goff for preoperative clearance of left foot lapidus bunionectomy, left possible akin osteotomy under monitored anesthesia on 12-.      She has hypertension controlled on lisinopril and hctz. She was going to [employer] 3x weekly but now canceled the membership due to her surgery. She can do 2 flights of stairs without any chest pain or shortness of breath with exertion.     She has had monitored anesthesia in past with no nausea or vomiting.    Current activity level:  8      ?Can take care of self, such as eat, dress, or use the toilet (1 MET)  ?Can walk up a flight of steps or a hill or walk on level ground at 3 to 4 mph (4 METs)  ?Can do heavy work around the house, such as scrubbing floors or lifting or moving heavy furniture, or climb two flights of stairs (between 4 and 10 METs)  ?Can participate in strenuous sports such as swimming, singles tennis, football, basketball, and skiing (>10 METs)        MEDICAL HISTORY    Past Medical History:   Diagnosis Date   • Asthma     Mild, intermittent   • HTN (hypertension)    • Metabolic syndrome    • Seasonal allergic rhinitis        FAMILY HISTORY    Family History   Problem Relation Age of Onset   • Heart disease Father         chf   • High blood pressure Father    • High cholesterol Father    • Kidney disease Father    • Stroke Father    • Hypertension Mother    • * Brother         back problems   • * Daughter         well   • * Daughter         well   • * Daughter         well   • * Son         well   • Cancer Maternal Grandmother         kidney   • Diabetes Maternal Grandmother    • Heart disease Maternal Grandmother    • Kidney disease  Maternal Grandmother    • Cancer Maternal Grandfather         bladder   • Stroke Paternal Grandmother    • Cancer Paternal Grandfather         colon       SOCIAL HISTORY    Social History     Tobacco Use   • Smoking status: Never Smoker   • Smokeless tobacco: Never Used   Substance Use Topics   • Alcohol use: No       SURGICAL HISTORY    Past Surgical History:   Procedure Laterality Date   • Breast reduction surgery      bilateral   • Lasik surgery Bilateral    • Sinus surgery     • Tonsillectomy and adenoidectomy     • Tubal ligation         CURRENT MEDICATIONS    Current Outpatient Medications   Medication Sig Dispense Refill   • hydrochlorothiazide (HYDRODIURIL) 25 MG tablet Take 1 tablet by mouth daily. 90 tablet 3   • lisinopril (ZESTRIL) 20 MG tablet Take 1 tablet by mouth daily. 90 tablet 3     No current facility-administered medications for this visit.       ALLERGIES    ALLERGIES:   Allergen Reactions   • Levofloxacin ANAPHYLAXIS   • Latex Other (See Comments)   • Levaquin Other (See Comments)     Wheezing         REVIEW OF SYSTEMS      14 point review of systems is negative except for that noted above and below:      None     PHYSICAL EXAM    VITAL SIGNS:    Visit Vitals  /68 (BP Location: LUE - Left upper extremity, Patient Position: Sitting, Cuff Size: Large Adult)   Pulse 82   Temp 98.1 °F (36.7 °C) (Temporal)   Resp 16   Ht 5' 3.5\" (1.613 m)   Wt 102.2 kg (225 lb 6.4 oz)   SpO2 99%   BMI 39.30 kg/m²      Constitutional:  Well-developed, no acute distress.   HENT:  Normocephalic. Atraumatic. Bilateral external ears normal. Oropharynx moist. No oral exudates. No tonsillar or uvular edema.   Neck: Normal range of motion. No tenderness. Supple. No stridor.    Eyes:  PERRL (Pupils equal, round, reactive to light), EOMI (extraocular movements intact). Conjunctivae normal. No discharge.    Cardiovascular:  Normal rate. Normal rhythm. No murmurs, gallops, or rubs.  2 + DP (dorsalis pedis) pulses with no  lower extremity edema.  Respiratory:  No respiratory distress. Normal respiratory effort.  Normal breath sounds. No rales. No wheezing.    Gastrointestinal:  Bowel sounds normal. Soft. No tenderness. No distension.  No masses. No pulsatile masses. No hepatosplenomegaly.   Integument:  Warm. Dry. No erythema. No rash.    Neurologic:  Alert & oriented x 3.         ASSESSMENT    50 year old female with planned surgery as above.      Known risk factors for perioperative complications: hypertension     Patient is currently stable, medically optimized and ready for surgery.      PLAN      1. Preoperative workup as follows: CBC, BMP  2. Change in medication regimen before surgery: surgery requesting 1 week hold in diuretic, no nsaids, vitamins, supplements one week prior to surgery   3. Prophylaxis for cardiac events with perioperative beta-blockers:  none    1. Preop exam for internal medicine  - BASIC METABOLIC PANEL; Future  - CBC WITH DIFFERENTIAL; Future    2. Essential hypertension  Good control. Continue current medication.    3. Bunion of left foot  She will have above surgery.    4. Stage 2 chronic kidney disease  Stable. Continue good fluid intake.     A copy of this consultation will be sent to the referring physician, Dr. Goff.     Her/She

## 2022-10-27 NOTE — ASU PATIENT PROFILE, ADULT - ABILITY TO HEAR (WITH HEARING AID OR HEARING APPLIANCE IF NORMALLY USED):
WEARS HEARING AIDS/Mildly to Moderately Impaired: difficulty hearing in some environments or speaker may need to increase volume or speak distinctly

## 2022-10-27 NOTE — ASU PATIENT PROFILE, ADULT - NSICDXPASTMEDICALHX_GEN_ALL_CORE_FT
PAST MEDICAL HISTORY:  Arthritis Rheumatoid    Cataracts, bilateral REMOVED WITH IOLI    Chronic kidney disease (CKD)     [place] (hard of hearing)     HTN (hypertension)     Lupus     Raynauds disease     Renal calculi     Rheumatoid arthritis     Scarring of lung ? d/t Lupus

## 2022-10-28 ENCOUNTER — APPOINTMENT (OUTPATIENT)
Dept: UROLOGY | Facility: HOSPITAL | Age: 57
End: 2022-10-28

## 2022-10-28 ENCOUNTER — OUTPATIENT (OUTPATIENT)
Dept: OUTPATIENT SERVICES | Facility: HOSPITAL | Age: 57
LOS: 1 days | Discharge: HOME | End: 2022-10-28
Payer: MEDICARE

## 2022-10-28 ENCOUNTER — TRANSCRIPTION ENCOUNTER (OUTPATIENT)
Age: 57
End: 2022-10-28

## 2022-10-28 VITALS
DIASTOLIC BLOOD PRESSURE: 78 MMHG | TEMPERATURE: 98 F | WEIGHT: 156.09 LBS | HEART RATE: 74 BPM | SYSTOLIC BLOOD PRESSURE: 156 MMHG | OXYGEN SATURATION: 99 % | HEIGHT: 64 IN

## 2022-10-28 VITALS — DIASTOLIC BLOOD PRESSURE: 85 MMHG | SYSTOLIC BLOOD PRESSURE: 142 MMHG | HEART RATE: 79 BPM

## 2022-10-28 DIAGNOSIS — Z98.890 OTHER SPECIFIED POSTPROCEDURAL STATES: Chronic | ICD-10-CM

## 2022-10-28 DIAGNOSIS — J38.1 POLYP OF VOCAL CORD AND LARYNX: Chronic | ICD-10-CM

## 2022-10-28 DIAGNOSIS — Z96.0 PRESENCE OF UROGENITAL IMPLANTS: Chronic | ICD-10-CM

## 2022-10-28 PROCEDURE — 52351 CYSTOURETERO & OR PYELOSCOPE: CPT | Mod: LT

## 2022-10-28 PROCEDURE — 74420 UROGRAPHY RTRGR +-KUB: CPT | Mod: 26

## 2022-10-28 RX ORDER — ONDANSETRON 8 MG/1
4 TABLET, FILM COATED ORAL ONCE
Refills: 0 | Status: DISCONTINUED | OUTPATIENT
Start: 2022-10-28 | End: 2022-10-28

## 2022-10-28 RX ORDER — SODIUM CHLORIDE 9 MG/ML
1000 INJECTION, SOLUTION INTRAVENOUS
Refills: 0 | Status: DISCONTINUED | OUTPATIENT
Start: 2022-10-28 | End: 2022-10-28

## 2022-10-28 RX ORDER — HYDROMORPHONE HYDROCHLORIDE 2 MG/ML
0.5 INJECTION INTRAMUSCULAR; INTRAVENOUS; SUBCUTANEOUS
Refills: 0 | Status: DISCONTINUED | OUTPATIENT
Start: 2022-10-28 | End: 2022-10-28

## 2022-10-28 RX ADMIN — SODIUM CHLORIDE 100 MILLILITER(S): 9 INJECTION, SOLUTION INTRAVENOUS at 11:03

## 2022-10-28 NOTE — ASU PREOP CHECKLIST - PATIENT PROBLEMS/NEEDS
BLE grossly assessed to 3/5, LUE grossly assessed to 3+/5/grossly assessed due to Patient expressed no known problems or needs

## 2022-10-28 NOTE — ASU DISCHARGE PLAN (ADULT/PEDIATRIC) - CARE PROVIDER_API CALL
Michael De La O)  Urology  [address] [place] [address], [address]  [place] [address]  Phone: [phone]  Fax: [phone]  Follow Up Time:

## 2022-10-28 NOTE — ASU DISCHARGE PLAN (ADULT/PEDIATRIC) - CALL YOUR DOCTOR IF YOU HAVE ANY OF THE FOLLOWING:
Pain not relieved by Medications/Numbness, tingling, color or temperature change to extremity/Unable to urinate

## 2022-10-28 NOTE — BRIEF OPERATIVE NOTE - OPERATION/FINDINGS
sp cysto left RPG, ureteroscopy to level of stricture.  confirms mid ureteral stricture long at least 2-3 cm. non obliterative, approximately 5 [place]    planned for ureteroplasty.

## 2022-10-28 NOTE — ASU DISCHARGE PLAN (ADULT/PEDIATRIC) - NS MD DC FALL RISK RISK
For information on Fall & Injury Prevention, visit: [email or web address] OR  [email or web address] OR  https://www.cdc.gov/steadi/patient.html

## 2022-10-31 ENCOUNTER — NON-APPOINTMENT (OUTPATIENT)
Age: 57
End: 2022-10-31

## 2022-11-02 DIAGNOSIS — N13.1 HYDRONEPHROSIS WITH URETERAL STRICTURE, NOT ELSEWHERE CLASSIFIED: ICD-10-CM

## 2022-11-02 DIAGNOSIS — M06.9 RHEUMATOID ARTHRITIS, UNSPECIFIED: ICD-10-CM

## 2022-11-02 DIAGNOSIS — Z87.442 PERSONAL HISTORY OF URINARY CALCULI: ICD-10-CM

## 2022-11-02 DIAGNOSIS — I73.00 RAYNAUD'S SYNDROME WITHOUT GANGRENE: ICD-10-CM

## 2022-11-02 DIAGNOSIS — M32.9 SYSTEMIC LUPUS ERYTHEMATOSUS, UNSPECIFIED: ICD-10-CM

## 2022-11-02 DIAGNOSIS — Z79.82 LONG TERM (CURRENT) USE OF ASPIRIN: ICD-10-CM

## 2022-11-02 DIAGNOSIS — M19.90 UNSPECIFIED OSTEOARTHRITIS, UNSPECIFIED SITE: ICD-10-CM

## 2022-11-02 DIAGNOSIS — I12.9 HYPERTENSIVE CHRONIC KIDNEY DISEASE WITH STAGE 1 THROUGH STAGE 4 CHRONIC KIDNEY DISEASE, OR UNSPECIFIED CHRONIC KIDNEY DISEASE: ICD-10-CM

## 2022-11-02 DIAGNOSIS — H91.90 UNSPECIFIED HEARING LOSS, UNSPECIFIED EAR: ICD-10-CM

## 2022-11-02 DIAGNOSIS — N18.9 CHRONIC KIDNEY DISEASE, UNSPECIFIED: ICD-10-CM

## 2022-11-06 ENCOUNTER — LABORATORY RESULT (OUTPATIENT)
Age: 57
End: 2022-11-06

## 2022-11-07 ENCOUNTER — NON-APPOINTMENT (OUTPATIENT)
Age: 57
End: 2022-11-07

## 2022-11-08 ENCOUNTER — OUTPATIENT (OUTPATIENT)
Dept: OUTPATIENT SERVICES | Facility: HOSPITAL | Age: 57
LOS: 1 days | Discharge: HOME | End: 2022-11-08

## 2022-11-08 ENCOUNTER — TRANSCRIPTION ENCOUNTER (OUTPATIENT)
Age: 57
End: 2022-11-08

## 2022-11-08 ENCOUNTER — RESULT REVIEW (OUTPATIENT)
Age: 57
End: 2022-11-08

## 2022-11-08 VITALS
OXYGEN SATURATION: 95 % | HEART RATE: 80 BPM | SYSTOLIC BLOOD PRESSURE: 165 MMHG | RESPIRATION RATE: 18 BRPM | TEMPERATURE: 98 F | DIASTOLIC BLOOD PRESSURE: 82 MMHG

## 2022-11-08 VITALS
HEIGHT: 64 IN | TEMPERATURE: 98 F | SYSTOLIC BLOOD PRESSURE: 140 MMHG | RESPIRATION RATE: 18 BRPM | DIASTOLIC BLOOD PRESSURE: 80 MMHG | OXYGEN SATURATION: 99 % | WEIGHT: 158.07 LBS | HEART RATE: 71 BPM

## 2022-11-08 DIAGNOSIS — Z98.890 OTHER SPECIFIED POSTPROCEDURAL STATES: Chronic | ICD-10-CM

## 2022-11-08 DIAGNOSIS — Z96.0 PRESENCE OF UROGENITAL IMPLANTS: Chronic | ICD-10-CM

## 2022-11-08 DIAGNOSIS — J38.1 POLYP OF VOCAL CORD AND LARYNX: Chronic | ICD-10-CM

## 2022-11-08 DIAGNOSIS — N13.5 CROSSING VESSEL AND STRICTURE OF URETER WITHOUT HYDRONEPHROSIS: ICD-10-CM

## 2022-11-08 PROCEDURE — 50432 PLMT NEPHROSTOMY CATHETER: CPT | Mod: LT

## 2022-11-08 NOTE — ASU PATIENT PROFILE, ADULT - PATIENT REPRESENTATIVE: ( YOU CAN CHOOSE ANY PERSON THAT CAN ASSIST YOU WITH YOUR HEALTH CARE PREFERENCES, DOES NOT HAVE TO BE A SPOUSE, IMMEDIATE FAMILY OR SIGNIFICANT OTHER/PARTNER)
pt edu on need to do the yag laser first before the LRI OD. pt edu will have slight myopia after surgery with LRI OD. will do LRI after yag OD. Declines

## 2022-11-08 NOTE — ASU PATIENT PROFILE, ADULT - NSICDXPASTMEDICALHX_GEN_ALL_CORE_FT
PAST MEDICAL HISTORY:  Arthritis Rheumatoid    Cataracts, bilateral REMOVED WITH IOLI    Chronic kidney disease (CKD)     [place] (hard of hearing)     HTN (hypertension)     Lupus     Raynauds disease     Renal calculi     Rheumatoid arthritis     Scarring of lung ? d/t Lupus

## 2022-11-08 NOTE — ASU DISCHARGE PLAN (ADULT/PEDIATRIC) - NS MD DC FALL RISK RISK
For information on Fall & Injury Prevention, visit: [email or web address] OR  [email or web address] OR  https://www.cdc.gov/steadi/patient.html

## 2022-11-08 NOTE — ASU PATIENT PROFILE, ADULT - PAIN CHRONIC, PROFILE
Ketoconazole Pregnancy And Lactation Text: This medication is Pregnancy Category C and it isn't know if it is safe during pregnancy. It is also excreted in breast milk and breast feeding isn't recommended. no

## 2022-11-08 NOTE — CHART NOTE - NSCHARTNOTEFT_GEN_A_CORE
PACU ANESTHESIA ADMISSION NOTE      Procedure:   Post op diagnosis:      ____  Intubated  TV:______       Rate: ______      FiO2: ______    _x___  Patent Airway    _x___  Full return of protective reflexes    _x___  Full recovery from anesthesia / back to baseline status    Vitals:  T(C): 36.5 (11-08-22 @ 17:52), Max: 36.5 (11-08-22 @ 13:44)  HR: 71 (11-08-22 @ 17:52) (71 - 71)  BP: 140/80 (11-08-22 @ 17:52) (140/80 - 140/80)  RR: 18 (11-08-22 @ 17:52) (18 - 18)  SpO2: 99% (11-08-22 @ 17:52) (99% - 99%)    Mental Status:  _x___ Awake   _____ Alert   _____ Drowsy   _____ Sedated    Nausea/Vomiting:  _x___  NO       ______Yes,   See Post - Op Orders         Pain Scale (0-10):  __0___    Treatment: _x___ None    ____ See Post - Op/PCA Orders    Post - Operative Fluids:   __x__ Oral   ____ See Post - Op Orders    Plan: Discharge:   _x___Home       _____Floor     _____Critical Care    _____  Other:_________________    Comments:  No anesthesia issues or complications noted.  Discharge when criteria met.

## 2022-11-08 NOTE — ASU PATIENT PROFILE, ADULT - FALL HARM RISK - UNIVERSAL INTERVENTIONS
Bed in lowest position, wheels locked, appropriate side rails in place/Call bell, personal items and telephone in reach/Instruct patient to call for assistance before getting out of bed or chair/Non-slip footwear when patient is out of bed/[place] to call system/Physically safe environment - no spills, clutter or unnecessary equipment/Purposeful Proactive Rounding/Room/bathroom lighting operational, light cord in reach

## 2022-11-08 NOTE — ASU PATIENT PROFILE, ADULT - ABILITY TO HEAR (WITH HEARING AID OR HEARING APPLIANCE IF NORMALLY USED):
hearing appliances left home/Mildly to Moderately Impaired: difficulty hearing in some environments or speaker may need to increase volume or speak distinctly

## 2022-11-09 ENCOUNTER — APPOINTMENT (OUTPATIENT)
Dept: UROLOGY | Facility: HOSPITAL | Age: 57
End: 2022-11-09

## 2022-11-09 ENCOUNTER — INPATIENT (INPATIENT)
Facility: HOSPITAL | Age: 57
LOS: 1 days | Discharge: HOME | End: 2022-11-11
Attending: UROLOGY | Admitting: UROLOGY
Payer: MEDICARE

## 2022-11-09 ENCOUNTER — TRANSCRIPTION ENCOUNTER (OUTPATIENT)
Age: 57
End: 2022-11-09

## 2022-11-09 ENCOUNTER — APPOINTMENT (OUTPATIENT)
Dept: OTOLARYNGOLOGY | Facility: HOSPITAL | Age: 57
End: 2022-11-09

## 2022-11-09 VITALS
HEIGHT: 64 IN | WEIGHT: 156.09 LBS | TEMPERATURE: 98 F | HEART RATE: 87 BPM | SYSTOLIC BLOOD PRESSURE: 137 MMHG | OXYGEN SATURATION: 94 % | DIASTOLIC BLOOD PRESSURE: 79 MMHG

## 2022-11-09 DIAGNOSIS — Z96.0 PRESENCE OF UROGENITAL IMPLANTS: Chronic | ICD-10-CM

## 2022-11-09 DIAGNOSIS — Z98.890 OTHER SPECIFIED POSTPROCEDURAL STATES: Chronic | ICD-10-CM

## 2022-11-09 DIAGNOSIS — J38.1 POLYP OF VOCAL CORD AND LARYNX: Chronic | ICD-10-CM

## 2022-11-09 DIAGNOSIS — N13.5 CROSSING VESSEL AND STRICTURE OF URETER WITHOUT HYDRONEPHROSIS: ICD-10-CM

## 2022-11-09 LAB
ANION GAP SERPL CALC-SCNC: 15 MMOL/L — HIGH (ref 7–14)
ANION GAP SERPL CALC-SCNC: 26 MMOL/L — HIGH (ref 7–14)
ANISOCYTOSIS BLD QL: SLIGHT — SIGNIFICANT CHANGE UP
BASOPHILS # BLD AUTO: 0 K/UL — SIGNIFICANT CHANGE UP (ref 0–0.2)
BASOPHILS NFR BLD AUTO: 0 % — SIGNIFICANT CHANGE UP (ref 0–1)
BLD GP AB SCN SERPL QL: SIGNIFICANT CHANGE UP
BUN SERPL-MCNC: 24 MG/DL — HIGH (ref 10–20)
BUN SERPL-MCNC: 24 MG/DL — HIGH (ref 10–20)
CALCIUM SERPL-MCNC: 9.3 MG/DL — SIGNIFICANT CHANGE UP (ref 8.4–10.5)
CALCIUM SERPL-MCNC: <0.8 MG/DL — CRITICAL LOW (ref 8.4–10.4)
CHLORIDE SERPL-SCNC: 101 MMOL/L — SIGNIFICANT CHANGE UP (ref 98–110)
CHLORIDE SERPL-SCNC: 96 MMOL/L — LOW (ref 98–110)
CO2 SERPL-SCNC: 12 MMOL/L — LOW (ref 17–32)
CO2 SERPL-SCNC: 20 MMOL/L — SIGNIFICANT CHANGE UP (ref 17–32)
CREAT SERPL-MCNC: 1.1 MG/DL — SIGNIFICANT CHANGE UP (ref 0.7–1.5)
CREAT SERPL-MCNC: 1.1 MG/DL — SIGNIFICANT CHANGE UP (ref 0.7–1.5)
EGFR: 59 ML/MIN/1.73M2 — LOW
EGFR: 59 ML/MIN/1.73M2 — LOW
EOSINOPHIL # BLD AUTO: 0 K/UL — SIGNIFICANT CHANGE UP (ref 0–0.7)
EOSINOPHIL NFR BLD AUTO: 0 % — SIGNIFICANT CHANGE UP (ref 0–8)
GIANT PLATELETS BLD QL SMEAR: PRESENT — SIGNIFICANT CHANGE UP
GLUCOSE SERPL-MCNC: 128 MG/DL — HIGH (ref 70–99)
GLUCOSE SERPL-MCNC: 142 MG/DL — HIGH (ref 70–99)
HCT VFR BLD CALC: 44.9 % — SIGNIFICANT CHANGE UP (ref 37–47)
HGB BLD-MCNC: 14.1 G/DL — SIGNIFICANT CHANGE UP (ref 12–16)
LYMPHOCYTES # BLD AUTO: 10.4 % — LOW (ref 20.5–51.1)
LYMPHOCYTES # BLD AUTO: 2.35 K/UL — SIGNIFICANT CHANGE UP (ref 1.2–3.4)
MANUAL SMEAR VERIFICATION: SIGNIFICANT CHANGE UP
MCHC RBC-ENTMCNC: 26.5 PG — LOW (ref 27–31)
MCHC RBC-ENTMCNC: 31.4 G/DL — LOW (ref 32–37)
MCV RBC AUTO: 84.4 FL — SIGNIFICANT CHANGE UP (ref 81–99)
MICROCYTES BLD QL: SLIGHT — SIGNIFICANT CHANGE UP
MONOCYTES # BLD AUTO: 0.2 K/UL — SIGNIFICANT CHANGE UP (ref 0.1–0.6)
MONOCYTES NFR BLD AUTO: 0.9 % — LOW (ref 1.7–9.3)
NEUTROPHILS # BLD AUTO: 20.05 K/UL — HIGH (ref 1.4–6.5)
NEUTROPHILS NFR BLD AUTO: 84.4 % — HIGH (ref 42.2–75.2)
NEUTS BAND # BLD: 4.3 % — SIGNIFICANT CHANGE UP (ref 0–6)
PLAT MORPH BLD: NORMAL — SIGNIFICANT CHANGE UP
PLATELET # BLD AUTO: 209 K/UL — SIGNIFICANT CHANGE UP (ref 130–400)
POTASSIUM SERPL-MCNC: 4.8 MMOL/L — SIGNIFICANT CHANGE UP (ref 3.5–5)
POTASSIUM SERPL-MCNC: >10 MMOL/L — CRITICAL HIGH (ref 3.5–5)
POTASSIUM SERPL-SCNC: 4.8 MMOL/L — SIGNIFICANT CHANGE UP (ref 3.5–5)
POTASSIUM SERPL-SCNC: >10 MMOL/L — CRITICAL HIGH (ref 3.5–5)
RBC # BLD: 5.32 M/UL — SIGNIFICANT CHANGE UP (ref 4.2–5.4)
RBC # FLD: 15.5 % — HIGH (ref 11.5–14.5)
RBC BLD AUTO: ABNORMAL
SODIUM SERPL-SCNC: 134 MMOL/L — LOW (ref 135–146)
SODIUM SERPL-SCNC: 136 MMOL/L — SIGNIFICANT CHANGE UP (ref 135–146)
WBC # BLD: 22.6 K/UL — HIGH (ref 4.8–10.8)
WBC # FLD AUTO: 22.6 K/UL — HIGH (ref 4.8–10.8)

## 2022-11-09 PROCEDURE — 50700 REVISION OF URETER: CPT | Mod: LT,22

## 2022-11-09 PROCEDURE — 52351 CYSTOURETERO & OR PYELOSCOPE: CPT | Mod: LT

## 2022-11-09 PROCEDURE — 40818 EXCISE ORAL MUCOSA FOR GRAFT: CPT

## 2022-11-09 PROCEDURE — 15769 GRFG AUTOL SOFT TISS DIR EXC: CPT | Mod: LT

## 2022-11-09 PROCEDURE — 52332 CYSTOSCOPY AND TREATMENT: CPT | Mod: LT

## 2022-11-09 PROCEDURE — 49905 OMENTAL FLAP INTRA-ABDOM: CPT

## 2022-11-09 PROCEDURE — S2900 ROBOTIC SURGICAL SYSTEM: CPT | Mod: NC

## 2022-11-09 RX ORDER — SODIUM CHLORIDE 9 MG/ML
1000 INJECTION, SOLUTION INTRAVENOUS
Refills: 0 | Status: DISCONTINUED | OUTPATIENT
Start: 2022-11-09 | End: 2022-11-09

## 2022-11-09 RX ORDER — HYDROMORPHONE HYDROCHLORIDE 2 MG/ML
1 INJECTION INTRAMUSCULAR; INTRAVENOUS; SUBCUTANEOUS
Refills: 0 | Status: DISCONTINUED | OUTPATIENT
Start: 2022-11-09 | End: 2022-11-09

## 2022-11-09 RX ORDER — AMLODIPINE BESYLATE 2.5 MG/1
1 TABLET ORAL
Qty: 0 | Refills: 0 | DISCHARGE

## 2022-11-09 RX ORDER — CHLORHEXIDINE GLUCONATE 213 G/1000ML
15 SOLUTION TOPICAL
Refills: 0 | Status: DISCONTINUED | OUTPATIENT
Start: 2022-11-09 | End: 2022-11-11

## 2022-11-09 RX ORDER — MYCOPHENOLATE MOFETIL 250 MG/1
2 CAPSULE ORAL
Qty: 0 | Refills: 0 | DISCHARGE

## 2022-11-09 RX ORDER — POTASSIUM CITRATE MONOHYDRATE 100 %
2160 POWDER (GRAM) MISCELLANEOUS
Qty: 0 | Refills: 0 | DISCHARGE

## 2022-11-09 RX ORDER — ACETAMINOPHEN 500 MG
1000 TABLET ORAL EVERY 6 HOURS
Refills: 0 | Status: DISCONTINUED | OUTPATIENT
Start: 2022-11-09 | End: 2022-11-11

## 2022-11-09 RX ORDER — PANTOPRAZOLE SODIUM 20 MG/1
40 TABLET, DELAYED RELEASE ORAL
Refills: 0 | Status: DISCONTINUED | OUTPATIENT
Start: 2022-11-09 | End: 2022-11-11

## 2022-11-09 RX ORDER — CHOLECALCIFEROL (VITAMIN D3) 125 MCG
0 CAPSULE ORAL
Qty: 0 | Refills: 0 | DISCHARGE

## 2022-11-09 RX ORDER — ONDANSETRON 8 MG/1
4 TABLET, FILM COATED ORAL ONCE
Refills: 0 | Status: DISCONTINUED | OUTPATIENT
Start: 2022-11-09 | End: 2022-11-09

## 2022-11-09 RX ORDER — KETOROLAC TROMETHAMINE 30 MG/ML
15 SYRINGE (ML) INJECTION EVERY 6 HOURS
Refills: 0 | Status: DISCONTINUED | OUTPATIENT
Start: 2022-11-09 | End: 2022-11-11

## 2022-11-09 RX ORDER — HYDROMORPHONE HYDROCHLORIDE 2 MG/ML
0.5 INJECTION INTRAMUSCULAR; INTRAVENOUS; SUBCUTANEOUS
Refills: 0 | Status: DISCONTINUED | OUTPATIENT
Start: 2022-11-09 | End: 2022-11-09

## 2022-11-09 RX ORDER — SODIUM CHLORIDE 9 MG/ML
1000 INJECTION INTRAMUSCULAR; INTRAVENOUS; SUBCUTANEOUS
Refills: 0 | Status: DISCONTINUED | OUTPATIENT
Start: 2022-11-09 | End: 2022-11-10

## 2022-11-09 RX ORDER — AMLODIPINE BESYLATE 2.5 MG/1
5 TABLET ORAL DAILY
Refills: 0 | Status: DISCONTINUED | OUTPATIENT
Start: 2022-11-09 | End: 2022-11-11

## 2022-11-09 RX ORDER — MYCOPHENOLATE MOFETIL 250 MG/1
500 CAPSULE ORAL
Refills: 0 | Status: DISCONTINUED | OUTPATIENT
Start: 2022-11-09 | End: 2022-11-11

## 2022-11-09 RX ORDER — CEFAZOLIN SODIUM 1 G
1000 VIAL (EA) INJECTION EVERY 8 HOURS
Refills: 0 | Status: COMPLETED | OUTPATIENT
Start: 2022-11-09 | End: 2022-11-10

## 2022-11-09 RX ORDER — HEPARIN SODIUM 5000 [USP'U]/ML
5000 INJECTION INTRAVENOUS; SUBCUTANEOUS THREE TIMES A DAY
Refills: 0 | Status: DISCONTINUED | OUTPATIENT
Start: 2022-11-09 | End: 2022-11-11

## 2022-11-09 RX ORDER — ONDANSETRON 8 MG/1
4 TABLET, FILM COATED ORAL EVERY 6 HOURS
Refills: 0 | Status: DISCONTINUED | OUTPATIENT
Start: 2022-11-09 | End: 2022-11-11

## 2022-11-09 RX ORDER — ALLOPURINOL 300 MG
1 TABLET ORAL
Qty: 0 | Refills: 0 | DISCHARGE

## 2022-11-09 RX ORDER — MYCOPHENOLATE MOFETIL 250 MG/1
3 CAPSULE ORAL
Qty: 0 | Refills: 0 | DISCHARGE

## 2022-11-09 RX ORDER — ASPIRIN/CALCIUM CARB/MAGNESIUM 324 MG
1 TABLET ORAL
Qty: 0 | Refills: 0 | DISCHARGE

## 2022-11-09 RX ORDER — SENNA PLUS 8.6 MG/1
2 TABLET ORAL AT BEDTIME
Refills: 0 | Status: DISCONTINUED | OUTPATIENT
Start: 2022-11-09 | End: 2022-11-11

## 2022-11-09 RX ADMIN — SODIUM CHLORIDE 125 MILLILITER(S): 9 INJECTION INTRAMUSCULAR; INTRAVENOUS; SUBCUTANEOUS at 15:00

## 2022-11-09 RX ADMIN — CHLORHEXIDINE GLUCONATE 15 MILLILITER(S): 213 SOLUTION TOPICAL at 22:39

## 2022-11-09 RX ADMIN — HEPARIN SODIUM 5000 UNIT(S): 5000 INJECTION INTRAVENOUS; SUBCUTANEOUS at 22:39

## 2022-11-09 RX ADMIN — Medication 1000 MILLIGRAM(S): at 17:44

## 2022-11-09 RX ADMIN — HYDROMORPHONE HYDROCHLORIDE 0.5 MILLIGRAM(S): 2 INJECTION INTRAMUSCULAR; INTRAVENOUS; SUBCUTANEOUS at 16:15

## 2022-11-09 RX ADMIN — SENNA PLUS 2 TABLET(S): 8.6 TABLET ORAL at 22:40

## 2022-11-09 RX ADMIN — Medication 100 MILLIGRAM(S): at 22:37

## 2022-11-09 RX ADMIN — HYDROMORPHONE HYDROCHLORIDE 0.5 MILLIGRAM(S): 2 INJECTION INTRAMUSCULAR; INTRAVENOUS; SUBCUTANEOUS at 16:00

## 2022-11-09 RX ADMIN — Medication 15 MILLIGRAM(S): at 22:39

## 2022-11-09 NOTE — BRIEF OPERATIVE NOTE - NSICDXBRIEFPROCEDURE_GEN_ALL_CORE_FT
PROCEDURES:  [place]ul[place] of buccal mucosa graft 09-Nov-2022 13:37:37  Reta Madrigal  
PROCEDURES:  Ureteroplasty, left 09-Nov-2022 13:26:54  Michael De La O

## 2022-11-09 NOTE — BRIEF OPERATIVE NOTE - COMMENTS
I, Reta Madrigal PA-C, performed the duties of first assist during the above listed surgery which includes, but is not limited to, retraction, suctioning and suturing. There was no competent resident available for the case.
I, Sissy Whitmore PA-C, performed the duties of first assist during the above listed surgery which includes, but is not limited to, retraction, suctioning and suturing. There was no competent resident available for the case.

## 2022-11-09 NOTE — BRIEF OPERATIVE NOTE - OPERATION/FINDINGS
left robotic assisted buccal graft ureteroplasty
Right buccal mucosal graft obtained for ureteroplasty

## 2022-11-10 LAB
ANION GAP SERPL CALC-SCNC: 11 MMOL/L — SIGNIFICANT CHANGE UP (ref 7–14)
BASOPHILS # BLD AUTO: 0.01 K/UL — SIGNIFICANT CHANGE UP (ref 0–0.2)
BASOPHILS NFR BLD AUTO: 0.1 % — SIGNIFICANT CHANGE UP (ref 0–1)
BUN SERPL-MCNC: 20 MG/DL — SIGNIFICANT CHANGE UP (ref 10–20)
CALCIUM SERPL-MCNC: 8.9 MG/DL — SIGNIFICANT CHANGE UP (ref 8.4–10.5)
CHLORIDE SERPL-SCNC: 108 MMOL/L — SIGNIFICANT CHANGE UP (ref 98–110)
CO2 SERPL-SCNC: 23 MMOL/L — SIGNIFICANT CHANGE UP (ref 17–32)
CREAT SERPL-MCNC: 0.9 MG/DL — SIGNIFICANT CHANGE UP (ref 0.7–1.5)
CREAT SERPL-MCNC: 4.6 MG/DL — CRITICAL HIGH (ref 0.7–1.5)
CREAT SERPL-MCNC: 6.3 MG/DL — CRITICAL HIGH (ref 0.7–1.5)
EGFR: 11 ML/MIN/1.73M2 — LOW
EGFR: 7 ML/MIN/1.73M2 — LOW
EGFR: 75 ML/MIN/1.73M2 — SIGNIFICANT CHANGE UP
EOSINOPHIL # BLD AUTO: 0 K/UL — SIGNIFICANT CHANGE UP (ref 0–0.7)
EOSINOPHIL NFR BLD AUTO: 0 % — SIGNIFICANT CHANGE UP (ref 0–8)
GLUCOSE SERPL-MCNC: 95 MG/DL — SIGNIFICANT CHANGE UP (ref 70–99)
HCT VFR BLD CALC: 37.2 % — SIGNIFICANT CHANGE UP (ref 37–47)
HGB BLD-MCNC: 12.2 G/DL — SIGNIFICANT CHANGE UP (ref 12–16)
IMM GRANULOCYTES NFR BLD AUTO: 0.3 % — SIGNIFICANT CHANGE UP (ref 0.1–0.3)
LYMPHOCYTES # BLD AUTO: 17.5 % — LOW (ref 20.5–51.1)
LYMPHOCYTES # BLD AUTO: 2.28 K/UL — SIGNIFICANT CHANGE UP (ref 1.2–3.4)
MCHC RBC-ENTMCNC: 26.8 PG — LOW (ref 27–31)
MCHC RBC-ENTMCNC: 32.8 G/DL — SIGNIFICANT CHANGE UP (ref 32–37)
MCV RBC AUTO: 81.8 FL — SIGNIFICANT CHANGE UP (ref 81–99)
MONOCYTES # BLD AUTO: 0.76 K/UL — HIGH (ref 0.1–0.6)
MONOCYTES NFR BLD AUTO: 5.8 % — SIGNIFICANT CHANGE UP (ref 1.7–9.3)
NEUTROPHILS # BLD AUTO: 9.93 K/UL — HIGH (ref 1.4–6.5)
NEUTROPHILS NFR BLD AUTO: 76.3 % — HIGH (ref 42.2–75.2)
NRBC # BLD: 0 /100 WBCS — SIGNIFICANT CHANGE UP (ref 0–0)
PLATELET # BLD AUTO: 226 K/UL — SIGNIFICANT CHANGE UP (ref 130–400)
POTASSIUM SERPL-MCNC: 4.6 MMOL/L — SIGNIFICANT CHANGE UP (ref 3.5–5)
POTASSIUM SERPL-SCNC: 4.6 MMOL/L — SIGNIFICANT CHANGE UP (ref 3.5–5)
RBC # BLD: 4.55 M/UL — SIGNIFICANT CHANGE UP (ref 4.2–5.4)
RBC # FLD: 15.8 % — HIGH (ref 11.5–14.5)
SODIUM SERPL-SCNC: 142 MMOL/L — SIGNIFICANT CHANGE UP (ref 135–146)
WBC # BLD: 13.02 K/UL — HIGH (ref 4.8–10.8)
WBC # FLD AUTO: 13.02 K/UL — HIGH (ref 4.8–10.8)

## 2022-11-10 RX ORDER — SODIUM CHLORIDE 9 MG/ML
3 INJECTION INTRAMUSCULAR; INTRAVENOUS; SUBCUTANEOUS EVERY 8 HOURS
Refills: 0 | Status: DISCONTINUED | OUTPATIENT
Start: 2022-11-10 | End: 2022-11-11

## 2022-11-10 RX ADMIN — Medication 1000 MILLIGRAM(S): at 11:18

## 2022-11-10 RX ADMIN — HEPARIN SODIUM 5000 UNIT(S): 5000 INJECTION INTRAVENOUS; SUBCUTANEOUS at 17:28

## 2022-11-10 RX ADMIN — Medication 1000 MILLIGRAM(S): at 17:25

## 2022-11-10 RX ADMIN — MYCOPHENOLATE MOFETIL 500 MILLIGRAM(S): 250 CAPSULE ORAL at 17:27

## 2022-11-10 RX ADMIN — HEPARIN SODIUM 5000 UNIT(S): 5000 INJECTION INTRAVENOUS; SUBCUTANEOUS at 21:15

## 2022-11-10 RX ADMIN — AMLODIPINE BESYLATE 5 MILLIGRAM(S): 2.5 TABLET ORAL at 05:23

## 2022-11-10 RX ADMIN — SODIUM CHLORIDE 3 MILLILITER(S): 9 INJECTION INTRAMUSCULAR; INTRAVENOUS; SUBCUTANEOUS at 13:57

## 2022-11-10 RX ADMIN — Medication 1000 MILLIGRAM(S): at 11:48

## 2022-11-10 RX ADMIN — CHLORHEXIDINE GLUCONATE 15 MILLILITER(S): 213 SOLUTION TOPICAL at 17:28

## 2022-11-10 RX ADMIN — SENNA PLUS 2 TABLET(S): 8.6 TABLET ORAL at 21:16

## 2022-11-10 RX ADMIN — Medication 100 MILLIGRAM(S): at 05:21

## 2022-11-10 RX ADMIN — Medication 1000 MILLIGRAM(S): at 01:51

## 2022-11-10 RX ADMIN — SODIUM CHLORIDE 3 MILLILITER(S): 9 INJECTION INTRAMUSCULAR; INTRAVENOUS; SUBCUTANEOUS at 21:11

## 2022-11-10 RX ADMIN — CHLORHEXIDINE GLUCONATE 15 MILLILITER(S): 213 SOLUTION TOPICAL at 08:37

## 2022-11-10 RX ADMIN — HEPARIN SODIUM 5000 UNIT(S): 5000 INJECTION INTRAVENOUS; SUBCUTANEOUS at 05:24

## 2022-11-10 RX ADMIN — PANTOPRAZOLE SODIUM 40 MILLIGRAM(S): 20 TABLET, DELAYED RELEASE ORAL at 05:23

## 2022-11-10 RX ADMIN — Medication 15 MILLIGRAM(S): at 05:24

## 2022-11-10 RX ADMIN — Medication 1000 MILLIGRAM(S): at 05:21

## 2022-11-10 NOTE — PATIENT PROFILE ADULT - FALL HARM RISK - RISK INTERVENTIONS
Assistance OOB with selected safe patient handling equipment/Assistance with ambulation/Communicate Fall Risk and Risk Factors to all staff, patient, and family/Monitor gait and stability/Reinforce activity limits and safety measures with patient and family/Sit up slowly, dangle for a short time, stand at bedside before walking/Use of alarms - bed, chair and/or voice tab/Visual Cue: Yellow wristband/Bed in lowest position, wheels locked, appropriate side rails in place/Call bell, personal items and telephone in reach/Instruct patient to call for assistance before getting out of bed or chair/Non-slip footwear when patient is out of bed/[place] to call system/Physically safe environment - no spills, clutter or unnecessary equipment/Purposeful Proactive Rounding/Room/bathroom lighting operational, light cord in reach

## 2022-11-10 NOTE — ANESTHESIA FOLLOW-UP NOTE - PRIMARY SURGEON
Telephone Encounter by David Flynn at 11/09/18 04:02 PM     Author:  David Flynn Service:  (none) Author Type:  Patient      Filed:  11/09/18 04:02 PM Encounter Date:  11/8/2018 Status:  Signed     :  David Flynn (Patient )            Patient calling back. Call transferred to Nurse.[JN1.1T]      Revision History        User Key Date/Time User Provider Type Action    > JN1.1 11/09/18 04:02 PM David Flynn Patient  Sign    T - Template             [facility]

## 2022-11-10 NOTE — CHART NOTE - NSCHARTNOTEFT_GEN_A_CORE
ENE    Pt s/p ureteroplasty with buccal graft.  Pt doing well   DEVORA sent for creatinine 4.6 then 6.3  d/w attending  He wants nephrostomy open to drainage   Vanessa replaced  keep DEVORA    Pt to have VNS arranged for tube care  To be d/c home with Vanessa, nephrostomy and DEVORA  have nursing teach pt to record OP.  anticipate d/c tomorrow  as per Dr. De La O

## 2022-11-11 ENCOUNTER — TRANSCRIPTION ENCOUNTER (OUTPATIENT)
Age: 57
End: 2022-11-11

## 2022-11-11 VITALS
DIASTOLIC BLOOD PRESSURE: 80 MMHG | OXYGEN SATURATION: 97 % | RESPIRATION RATE: 180 BRPM | SYSTOLIC BLOOD PRESSURE: 138 MMHG | TEMPERATURE: 97 F | HEART RATE: 90 BPM

## 2022-11-11 LAB
ANION GAP SERPL CALC-SCNC: 8 MMOL/L — SIGNIFICANT CHANGE UP (ref 7–14)
BASOPHILS # BLD AUTO: 0.03 K/UL — SIGNIFICANT CHANGE UP (ref 0–0.2)
BASOPHILS NFR BLD AUTO: 0.3 % — SIGNIFICANT CHANGE UP (ref 0–1)
BUN SERPL-MCNC: 15 MG/DL — SIGNIFICANT CHANGE UP (ref 10–20)
CALCIUM SERPL-MCNC: 9 MG/DL — SIGNIFICANT CHANGE UP (ref 8.4–10.5)
CHLORIDE SERPL-SCNC: 108 MMOL/L — SIGNIFICANT CHANGE UP (ref 98–110)
CO2 SERPL-SCNC: 26 MMOL/L — SIGNIFICANT CHANGE UP (ref 17–32)
CREAT SERPL-MCNC: 0.8 MG/DL — SIGNIFICANT CHANGE UP (ref 0.7–1.5)
EGFR: 86 ML/MIN/1.73M2 — SIGNIFICANT CHANGE UP
EOSINOPHIL # BLD AUTO: 0.09 K/UL — SIGNIFICANT CHANGE UP (ref 0–0.7)
EOSINOPHIL NFR BLD AUTO: 0.9 % — SIGNIFICANT CHANGE UP (ref 0–8)
GLUCOSE SERPL-MCNC: 92 MG/DL — SIGNIFICANT CHANGE UP (ref 70–99)
HCT VFR BLD CALC: 40 % — SIGNIFICANT CHANGE UP (ref 37–47)
HGB BLD-MCNC: 12.6 G/DL — SIGNIFICANT CHANGE UP (ref 12–16)
IMM GRANULOCYTES NFR BLD AUTO: 0.2 % — SIGNIFICANT CHANGE UP (ref 0.1–0.3)
LYMPHOCYTES # BLD AUTO: 2.55 K/UL — SIGNIFICANT CHANGE UP (ref 1.2–3.4)
LYMPHOCYTES # BLD AUTO: 26.3 % — SIGNIFICANT CHANGE UP (ref 20.5–51.1)
MCHC RBC-ENTMCNC: 26.5 PG — LOW (ref 27–31)
MCHC RBC-ENTMCNC: 31.5 G/DL — LOW (ref 32–37)
MCV RBC AUTO: 84 FL — SIGNIFICANT CHANGE UP (ref 81–99)
MONOCYTES # BLD AUTO: 0.8 K/UL — HIGH (ref 0.1–0.6)
MONOCYTES NFR BLD AUTO: 8.2 % — SIGNIFICANT CHANGE UP (ref 1.7–9.3)
NEUTROPHILS # BLD AUTO: 6.21 K/UL — SIGNIFICANT CHANGE UP (ref 1.4–6.5)
NEUTROPHILS NFR BLD AUTO: 64.1 % — SIGNIFICANT CHANGE UP (ref 42.2–75.2)
NRBC # BLD: 0 /100 WBCS — SIGNIFICANT CHANGE UP (ref 0–0)
PLATELET # BLD AUTO: 211 K/UL — SIGNIFICANT CHANGE UP (ref 130–400)
POTASSIUM SERPL-MCNC: 4.2 MMOL/L — SIGNIFICANT CHANGE UP (ref 3.5–5)
POTASSIUM SERPL-SCNC: 4.2 MMOL/L — SIGNIFICANT CHANGE UP (ref 3.5–5)
RBC # BLD: 4.76 M/UL — SIGNIFICANT CHANGE UP (ref 4.2–5.4)
RBC # FLD: 15.4 % — HIGH (ref 11.5–14.5)
SODIUM SERPL-SCNC: 142 MMOL/L — SIGNIFICANT CHANGE UP (ref 135–146)
WBC # BLD: 9.7 K/UL — SIGNIFICANT CHANGE UP (ref 4.8–10.8)
WBC # FLD AUTO: 9.7 K/UL — SIGNIFICANT CHANGE UP (ref 4.8–10.8)

## 2022-11-11 RX ORDER — CHLORHEXIDINE GLUCONATE 213 G/1000ML
1 SOLUTION TOPICAL
Qty: 9 | Refills: 0
Start: 2022-11-11 | End: 2022-11-13

## 2022-11-11 RX ADMIN — Medication 1000 MILLIGRAM(S): at 03:13

## 2022-11-11 RX ADMIN — SODIUM CHLORIDE 3 MILLILITER(S): 9 INJECTION INTRAMUSCULAR; INTRAVENOUS; SUBCUTANEOUS at 05:32

## 2022-11-11 RX ADMIN — HEPARIN SODIUM 5000 UNIT(S): 5000 INJECTION INTRAVENOUS; SUBCUTANEOUS at 05:33

## 2022-11-11 RX ADMIN — CHLORHEXIDINE GLUCONATE 15 MILLILITER(S): 213 SOLUTION TOPICAL at 17:30

## 2022-11-11 RX ADMIN — AMLODIPINE BESYLATE 5 MILLIGRAM(S): 2.5 TABLET ORAL at 05:33

## 2022-11-11 RX ADMIN — SODIUM CHLORIDE 3 MILLILITER(S): 9 INJECTION INTRAMUSCULAR; INTRAVENOUS; SUBCUTANEOUS at 14:08

## 2022-11-11 RX ADMIN — PANTOPRAZOLE SODIUM 40 MILLIGRAM(S): 20 TABLET, DELAYED RELEASE ORAL at 05:33

## 2022-11-11 RX ADMIN — CHLORHEXIDINE GLUCONATE 15 MILLILITER(S): 213 SOLUTION TOPICAL at 07:50

## 2022-11-11 RX ADMIN — Medication 1000 MILLIGRAM(S): at 14:26

## 2022-11-11 RX ADMIN — HEPARIN SODIUM 5000 UNIT(S): 5000 INJECTION INTRAVENOUS; SUBCUTANEOUS at 14:05

## 2022-11-11 RX ADMIN — Medication 1000 MILLIGRAM(S): at 09:00

## 2022-11-11 NOTE — DISCHARGE NOTE PROVIDER - NSDCFUSCHEDAPPT_GEN_ALL_CORE_FT
[facility] Physician [facility]  UROLOGY [address] [place] [address]  Scheduled Appointment: 11/17/2022

## 2022-11-11 NOTE — DISCHARGE NOTE PROVIDER - NSDCMRMEDTOKEN_GEN_ALL_CORE_FT
allopurinol 100 mg oral tablet: 1 tab(s) orally once a day  amLODIPine 5 mg oral tablet: 1 tab(s) orally once a day  apple cider gummies:   aspirin 81 mg oral tablet, chewable: 1 tab(s) orally once a day  CellCept 500 mg oral tablet: 2  orally once a day (at bedtime)  CellCept 500 mg oral tablet: 3 tab(s) orally once a day in AM  oxycodone-acetaminophen 5 mg-300 mg oral tablet: 1 tab(s) orally every 6 hours, As Needed MDD:4 tabs   potassium citrate: 2160 milligram(s) orally once a day  Vitamin D3: orally once a day

## 2022-11-11 NOTE — PROGRESS NOTE ADULT - SUBJECTIVE AND OBJECTIVE BOX
ENT DAILY PROGRESS NOTE    Pt is a 56y Female admitted s/p left robotic assisted, harvest of buccal mucosa graft, ureteroplasty, POD#1. Patient seen and examined at bedside. She is c/o mild- incisional pain, L > R. Patient reports mouth has been feeling better, reports she has been able to eat soft food without issue. Denies any fever, chills, SOB/difficulty breathing, N/V.       REVIEW OF SYSTEMS   [x] A ten-point review of systems was otherwise negative except as noted.      Allergies    No Known Allergies    Intolerances        MEDICATIONS:  acetaminophen     Tablet .. 1000 milliGRAM(s) Oral every 6 hours  amLODIPine   Tablet 5 milliGRAM(s) Oral daily  chlorhexidine 0.12% Liquid 15 milliLiter(s) Swish and Spit two times a day  heparin   Injectable 5000 Unit(s) SubCutaneous three times a day  ketorolac   Injectable 15 milliGRAM(s) IV Push every 6 hours  mycophenolate mofetil 500 milliGRAM(s) Oral two times a day  ondansetron Injectable 4 milliGRAM(s) IV Push every 6 hours PRN  pantoprazole    Tablet 40 milliGRAM(s) Oral before breakfast  senna 2 Tablet(s) Oral at bedtime  sodium chloride 0.9% lock flush 3 milliLiter(s) IV Push every 8 hours      Vital Signs Last 24 Hrs  T(C): 35.8 (10 Nov 2022 08:41), Max: 36.7 (09 Nov 2022 16:30)  T(F): 96.4 (10 Nov 2022 08:41), Max: 98.1 (09 Nov 2022 16:30)  HR: 88 (10 Nov 2022 08:41) (58 - 88)  BP: 140/93 (10 Nov 2022 08:41) (113/56 - 160/82)  BP(mean): 96 (09 Nov 2022 14:35) (94 - 96)  RR: 18 (10 Nov 2022 08:41) (12 - 18)  SpO2: 96% (10 Nov 2022 08:41) (96% - 99%)    Parameters below as of 10 Nov 2022 05:00  Patient On (Oxygen Delivery Method): room air          11-09 @ 07:01  -  11-10 @ 07:00  --------------------------------------------------------  IN:    sodium chloride 0.9%: 500 mL  Total IN: 500 mL    OUT:    Bulb (mL): 70 mL    Indwelling Catheter - Urethral (mL): 1830 mL  Total OUT: 1900 mL    Total NET: -1400 mL      11-10 @ 07:01  -  11-10 @ 11:59  --------------------------------------------------------  IN:  Total IN: 0 mL    OUT:    Bulb (mL): 15 mL    Indwelling Catheter - Urethral (mL): 1900 mL  Total OUT: 1915 mL    Total NET: -1915 mL          PHYSICAL EXAM:    GEN: Well-developed, well-nourished. NAD, awake and alert. No drooling or pooling of secretions. No stridor or stertor. Good vocal quality, no hoarseness.   SKIN: Good color, non diaphoretic  HEENT: NC/AT; Oral mucosa pink and moist. Left bucal mucosa, incision noted with granulation tissue- c/d/i, no edema,  no active bleeding noted. No erythema or edema noted to  tongue, FOM, uvula or. Uvula midline. Posterior oropharynx clear   NECK:  Trachea midline. Neck supple, no TTP to B/L lateral neck, no cervical LAD.  RESP: No dyspnea, non-labored breathing. No use of accessory muscles.  CARDIO: +S1/S2  ABDO: Soft, NT.  EXT: KAUR x 4      LABS:  CBC-                        12.2   13.02 )-----------( 226      ( 10 Nov 2022 06:03 )             37.2     BMP/CMP-  10 Nov 2022 06:03    142    |  108    |  20     ----------------------------<  95     4.6     |  23     |  0.9      Ca    8.9        10 Nov 2022 06:03      Coagulation Studies-    Endocrine Panel-  Calcium, Total Serum: 8.9 mg/dL (11-10 @ 06:03)  Calcium, Total Serum: 9.3 mg/dL (11-09 @ 18:17)  Calcium, Total Serum: <0.8 mg/dL (11-09 @ 15:51)              RADIOLOGY & ADDITIONAL STUDIES:  
  Progress Note POD # 0    Subjective Pt seen and examined at bedside   57 y/o Female s/p robotic assisted left ureteroplasty with buccal graft   Pt doing well c/o incisional pain, no nausea.    [x ] a 10 point review of systems was negative except where noted    Vital signs  T(C): , Max: 36.7 (11-08-22 @ 19:15)  HR: 75 (11-09-22 @ 16:00)  BP: 117/64 (11-09-22 @ 16:00)  SpO2: 98% (11-09-22 @ 16:00)    Gen NC/AT in NAD  SKIN warm, dry with good turgor  Neck supple, FROM  Buccal mucosa clean, dry, no active bleeding sutures in place  LUNGS No dyspnea, No accessory muscle use  BACK No CVAT  ABD    Soft non tender, bladder not palpable   Vanessa was draining clear yellow urine      Labs                        14.1   22.60 )-----------( 209      ( 09 Nov 2022 15:51 )             44.9       BMP pending                              
ENT DAILY PROGRESS NOTE    Pt is a 56y Female admitted s/p left robotic assisted, harvest of buccal mucosa graft, ureteroplasty, POD#2. Patient seen and examined at bedside this morning, no acute events noted overnight. Reports mild left mouth pain, however states that it improves with Tylenol. Otherwise states that she's tolerating her diet, no other complaints at this time.     REVIEW OF SYSTEMS   [x] A ten-point review of systems was otherwise negative except as noted.    Allergies  No Known Allergies    MEDICATIONS:  acetaminophen     Tablet .. 1000 milliGRAM(s) Oral every 6 hours  amLODIPine   Tablet 5 milliGRAM(s) Oral daily  chlorhexidine 0.12% Liquid 15 milliLiter(s) Swish and Spit two times a day  heparin   Injectable 5000 Unit(s) SubCutaneous three times a day  ketorolac   Injectable 15 milliGRAM(s) IV Push every 6 hours  mycophenolate mofetil 500 milliGRAM(s) Oral two times a day  ondansetron Injectable 4 milliGRAM(s) IV Push every 6 hours PRN  pantoprazole    Tablet 40 milliGRAM(s) Oral before breakfast  senna 2 Tablet(s) Oral at bedtime  sodium chloride 0.9% lock flush 3 milliLiter(s) IV Push every 8 hours    Vital Signs Last 24 Hrs  T(C): 36.1 (11 Nov 2022 08:11), Max: 36.2 (10 Nov 2022 12:16)  T(F): 97 (11 Nov 2022 08:11), Max: 97.1 (10 Nov 2022 12:16)  HR: 86 (11 Nov 2022 08:11) (71 - 86)  BP: 157/97 (11 Nov 2022 08:11) (130/65 - 160/90)  RR: 18 (11 Nov 2022 08:11) (18 - 18)  SpO2: 96% (11 Nov 2022 08:11) (93% - 97%)    Parameters below as of 11 Nov 2022 04:00  Patient On (Oxygen Delivery Method): room air    11-10 @ 07:01  -  11-11 @ 07:00  --------------------------------------------------------  IN:  Total IN: 0 mL    OUT:    Bulb (mL): 100 mL    Indwelling Catheter - Urethral (mL): 2475 mL    Nephrostomy Tube (mL): 1225 mL    Voided (mL): 400 mL  Total OUT: 4200 mL    Total NET: -4200 mL    PHYSICAL EXAM:  GEN: No acute distress, awake and alert. No drooling or pooling of secretions. No stridor or stertor. Good vocal quality, no hoarseness.   SKIN: Good color, non diaphoretic.  HEENT: Oral mucosa pink and moist. Left buccal mucosa harvest site noted w/ granulation tissue, c/d/i w/o active bleeding or drainage. No erythema or edema noted to buccal mucosa, tongue, FOM, uvula or posterior oropharynx. Uvula midline.   NECK: Trachea midline, Neck supple, no tenderness to palpation to bilateral lateral neck, no cervical LAD.   RESP: No dyspnea, non-labored breathing. No use of accessory muscles.   CARDIO: +S1/S2  ABDO: Soft, NT. DEVORA drain in place w/ serous output.  EXT: KAUR x 4  BACK: Nephrostomy w/ serosanguinous output.  : Smith in place draining yellow urine with small clot w/in smith tubing.    LABS:  CBC-             12.6   9.70  )-----------( 211      ( 11 Nov 2022 05:42 )             40.0     BMP/CMP-11 Nov 2022 05:42  142    |  108    |  15     ----------------------------<  92     4.2     |  26     |  0.8      Ca    9.0        11 Nov 2022 05:42    RADIOLOGY & ADDITIONAL STUDIES:  None relevant to review
Urology Progress Note:  55 y/o Female s/p robotic assisted ureteroplasty with buccal graft POD#2. Patient seen and examined at bedside this morning. Pt doing better overall. Patient tolerating her chlorhexidine rinses and diet. Patient stating she has intraoral pain post procedure that is controlled with Tylenol. Patient otherwise without complaints. Vanessa draining clear very slightly tinged to colorless urine,  DEVORA draining serousanguinous fluid that is more seruous than sanguinous and PCN in place draining clear blood tinged urine. Patient awaiting VNS to be set up and is anticipated for discharge today. No fevers, chills, n/v, SOB/diff breathing or LUTS.        PAST MEDICAL & SURGICAL HISTORY:  Lupus  Arthritis  Rheumatoid  Chronic kidney disease (CKD)  Raynauds disease  Rheumatoid arthritis  HTN (hypertension)  Renal calculi  Cataracts, bilateral  REMOVED WITH IOLI  Scarring of lung  ? d/t Lupus  [place] (hard of hearing)  History of D&amp;C  H/O lithotripsy  eswal  Laryngeal polyp  History of surgery  renal biopsy  History of surgery  B/L IOL  H/O colonoscopy 2018  S/P cystoscopy with ureteral stent placement 2/2022      [ x ] A 10 Point Review of Systems was negative except where noted      MEDICATIONS  (STANDING):  acetaminophen     Tablet .. 1000 milliGRAM(s) Oral every 6 hours  amLODIPine   Tablet 5 milliGRAM(s) Oral daily  chlorhexidine 0.12% Liquid 15 milliLiter(s) Swish and Spit two times a day  heparin   Injectable 5000 Unit(s) SubCutaneous three times a day  ketorolac   Injectable 15 milliGRAM(s) IV Push every 6 hours  mycophenolate mofetil 500 milliGRAM(s) Oral two times a day  pantoprazole    Tablet 40 milliGRAM(s) Oral before breakfast  senna 2 Tablet(s) Oral at bedtime  sodium chloride 0.9% lock flush 3 milliLiter(s) IV Push every 8 hours    MEDICATIONS  (PRN):  ondansetron Injectable 4 milliGRAM(s) IV Push every 6 hours PRN Nausea and/or Vomiting      Allergies: No Known Allergies    SOCIAL HISTORY: No illicit drug use    FAMILY HISTORY:  FH: breast cancer mother  Family history of type 2 diabetes mellitus in mother (Mother)  Family history of thyroid cancer (Mother)  FH: HTN (hypertension) (Mother)        Vital Signs Last 24 Hrs  T(C): 36.1 (11 Nov 2022 08:11), Max: 36.2 (10 Nov 2022 17:14)  T(F): 97 (11 Nov 2022 08:11), Max: 97.1 (10 Nov 2022 17:14)  HR: 86 (11 Nov 2022 08:11) (71 - 86)  BP: 157/97 (11 Nov 2022 08:11) (130/65 - 157/97)  RR: 18 (11 Nov 2022 08:11) (18 - 18)  SpO2: 96% (11 Nov 2022 08:11) (93% - 97%)    Parameters below as of 11 Nov 2022 04:00  Patient On (Oxygen Delivery Method): room air        PHYSICAL EXAM:  Constitutional: NAD, well-developed, well nourished, sitting comfortably in chair  HEENT: NC/AT  Back: No CVA ttp bilaterally,  PCN in place draining clear blood tinged urine.  Resp: No accessory respiratory muscle use  Abd: Soft, NT/ND. No suprapubic ttp. DEVORA draining serosanguinous fluid that is more serous than sanguinous.  Cardio: +S1/S2  : Vanessa draining clear very slightly tinged to colorless urine.  Ext: No edema or cyanosis, KAUR x 4  Neuro: Awake and alert  Psych: Normal mood, normal affect  Skin: Good color, non diaphoretic        I&O's Summary  10 Nov 2022 07:01  -  11 Nov 2022 07:00  --------------------------------------------------------  IN: 0 mL / OUT: 4200 mL / NET: -4200 mL      LABS:                      12.6   9.70  )-----------( 211      ( 11 Nov 2022 05:42 )             40.0       11-11  142  |  108  |  15  ----------------------------<  92  4.2   |  26  |  0.8    Ca    9.0      11 Nov 2022 05:42    
ENT DAILY POST-OP Note     Pt is a 56y Female admitted s/p left robotic assisted, harvest of buccal mucosa graft, ureteroplasty, POD#0. Patient seen and examined at bedside. Patient complaining of mild abdominal incisional pain and incisional mouth pain. Patient denies any fever, chills, N/V, SOB/difficulty breathing, CP.     REVIEW OF SYSTEMS   [x] A ten-point review of systems was otherwise negative except as noted.      Allergies    No Known Allergies    Intolerances        MEDICATIONS:  acetaminophen     Tablet .. 1000 milliGRAM(s) Oral every 6 hours  amLODIPine   Tablet 5 milliGRAM(s) Oral daily  ceFAZolin   IVPB 1000 milliGRAM(s) IV Intermittent every 8 hours  heparin   Injectable 5000 Unit(s) SubCutaneous three times a day  HYDROmorphone  Injectable 0.5 milliGRAM(s) IV Push every 10 minutes PRN  HYDROmorphone  Injectable 1 milliGRAM(s) IV Push every 10 minutes PRN  ketorolac   Injectable 15 milliGRAM(s) IV Push every 6 hours  lactated ringers. 1000 milliLiter(s) IV Continuous <Continuous>  mycophenolate mofetil 500 milliGRAM(s) Oral two times a day  ondansetron Injectable 4 milliGRAM(s) IV Push once PRN  ondansetron Injectable 4 milliGRAM(s) IV Push every 6 hours PRN  pantoprazole    Tablet 40 milliGRAM(s) Oral before breakfast  senna 2 Tablet(s) Oral at bedtime  sodium chloride 0.9%. 1000 milliLiter(s) IV Continuous <Continuous>      Vital Signs Last 24 Hrs  T(C): 36.6 (09 Nov 2022 14:23), Max: 36.7 (08 Nov 2022 19:15)  T(F): 97.9 (09 Nov 2022 14:23), Max: 98 (08 Nov 2022 19:15)  HR: 75 (09 Nov 2022 16:00) (58 - 87)  BP: 117/64 (09 Nov 2022 16:00) (117/64 - 165/82)  BP(mean): 96 (09 Nov 2022 14:35) (94 - 96)  RR: 14 (09 Nov 2022 16:00) (12 - 20)  SpO2: 98% (09 Nov 2022 16:00) (94% - 99%)    Parameters below as of 09 Nov 2022 16:00  Patient On (Oxygen Delivery Method): room air          11-09 @ 07:01  -  11-09 @ 16:13  --------------------------------------------------------  IN:    sodium chloride 0.9%: 125 mL  Total IN: 125 mL    OUT:  Total OUT: 0 mL    Total NET: 125 mL          PHYSICAL EXAM:    GEN: Well-developed, well-nourished. NAD, awake and alert. No drooling or pooling of secretions. No stridor or stertor. Good vocal quality, no hoarseness.   SKIN: Good color, non diaphoretic  HEENT: NC/AT; Oral mucosa pink and moist. Left bucal mucosa, incision- c/d/i, no active bleeding noted. No erythema or edema noted to  tongue, FOM, uvula or. Uvula midline. Posterior oropharynx clear   NECK:  Trachea midline. Neck supple, no TTP to B/L lateral neck, no cervical LAD.  RESP: No dyspnea, non-labored breathing. No use of accessory muscles.  CARDIO: +S1/S2  ABDO: Soft, NT.  EXT: KAUR x 4    LABS:  CBC-    BMP/CMP-        Coagulation Studies-    Endocrine Panel-              RADIOLOGY & ADDITIONAL STUDIES:  
  Progress Note POD # 1    Subjective Pt seen and examined at bedside   55 y/o Female s/p robotic assisted ureteroplasty with buccal graft.  Pt doing better, c/o incisional tenderness, wants to go home.    [ x ] a 10 point review of systems was negative except where noted    Vital signs  T(C): , Max: 36.7 (11-09-22 @ 16:30)  HR: 88 (11-10-22 @ 08:41)  BP: 140/93 (11-10-22 @ 08:41)  SpO2: 96% (11-10-22 @ 08:41)    Gen NC/AT in NAD  SKIN warm, dry with good turgor  Neck supple, FROM  Mouth graft site clean, no bleeding  LUNGS No dyspnea, No accessory muscle use  BACK No CVAT  ABD    Soft non tender, bladder not palpable  DEVORA draining serosanguinous fluid, 30 cc last shift, 70 since surgery   Vanessa draining clear yellow urine      Labs                        12.2   13.02 )-----------( 226      ( 10 Nov 2022 06:03 )             37.2     10 Nov 2022 06:03    142    |  108    |  20     ----------------------------<  95     4.6     |  23     |  0.9      Ca    8.9        10 Nov 2022 06:03

## 2022-11-11 NOTE — DISCHARGE NOTE PROVIDER - CARE PROVIDER_API CALL
Michael De La O)  Urology  [address] [place] [address], [address]  [place] [address]  Phone: [phone]  Fax: [phone]  Scheduled Appointment: 11/17/2022

## 2022-11-11 NOTE — CHART NOTE - NSCHARTNOTEFT_GEN_A_CORE
Called by  who stated that she was unable to set up VNS for patient after calling 20 facilities as patient is reportedly going to be staying with her son and daughter in [place] as they will be caring for her.   Discussed situation with patient who stated that she was capable of managing her drains on her own after receiving education from RN staff and requests to be discharged without VNS.  Patient aware to f/u as an outpatient on 11/17/22 for further  management.

## 2022-11-11 NOTE — DISCHARGE NOTE PROVIDER - HOSPITAL COURSE
55 y/o Female who underwent robotic assisted ureteroplasty with buccal graft 11/9/22. Patient had uncomplicated intraoperative course and was transferred to the floor in stable condition. Patient did well throughout hospital course and has been tolerating her diet without issues. Patient seen and examined at bedside POD#2. Pt doing well overall. Patient tolerating her chlorhexidine rinses and diet. Patient stating she has intraoral pain post procedure that is controlled with Tylenol. Patient otherwise without complaints. Vanessa draining clear very slightly tinged to colorless urine,  DEVORA draining serosanguinous fluid that is more seruous than sanguinous and PCN in place draining clear blood tinged urine. Patient awaiting VNS to be set up and is anticipated for discharge today. No fevers, chills, n/v, SOB/diff breathing or LUTS.

## 2022-11-11 NOTE — DISCHARGE NOTE PROVIDER - NSDCCPTREATMENT_GEN_ALL_CORE_FT
PRINCIPAL PROCEDURE  Procedure: Ureteroplasty, left  Findings and Treatment:       SECONDARY PROCEDURE  Procedure: [place] of buccal mucosa graft  Findings and Treatment:

## 2022-11-11 NOTE — PROGRESS NOTE ADULT - ASSESSMENT
55 y/o Female s/p robotic assisted ureteroplasty with buccal graft    A) Stable POD # 1    P) D/C Vanessa  empty DEVORA and record op, send fluid for creatinine later today  IR for nephrostomy removal  DVT/GI prophylaxis  OOB ambulating  possible d/c later today  d/w attending
57 y/o Female s/p robotic assisted left ureteroplasty with buccal graft     A) Stable POD # 0  s/p robotic ureteroplasty with buccal graft    P) Pain control  chlorhexidine mouth rinse q 8 hrs   abx  check BMP  GI/DVT prophylaxis  prepare for d/c tomorrow  clear liquids  OOB ambulating later today  will d/w attending
57 y/o female s/p robotic assisted ureteroplasty with buccal graft POD#2. Pt doing well overall, pain controlled. Smith draining clear very slightly tinged to colorless urine, DEVORA draining serousanguinous fluid that is more seruous than sanguinous and PCN in place draining clear blood tinged urine. Patient awaiting VNS to be set up and is anticipated for discharge today.      Plan:  - No further acute /surgical intervention indicated at this time  - CBC, BMP stable  - Vitals stable  - DVT/GI prophylaxis  - OOB ambulating  - Patient to be discharged with VNS for care for smith, PCN and DEVORA drainage  - Patient has f/u appointment with Dr. zuleta on 11/17/22 for further  management  - Stable for discharge pending VNS set up by Case Management who is aware  - Discussed with attending
Pt is a 56y Female admitted s/p left robotic assisted, harvest of buccal mucosa graft, ureteroplasty, POD#0. Patient clinically doing well     Plan:   - Patient will use Chlorhexidine 0.12 15 ml swish and spit TID for buccal mucosa care  - Diet as per primary team   - Continue IVF   - DVT/GI ppx   - Encourage Incentive spirometer   - Continue care per primary team    - D/w attending 
Pt is a 56y Female admitted s/p left robotic assisted, harvest of buccal mucosa graft, ureteroplasty, POD#0. Patient clinically doing well     Plan:   - Continue Chlorhexidine 0.12 15 ml swish and spit TID for buccal mucosa care  - Diet as per primary team - continue soft diet without red dye/citrus   - Continue IVF   - DVT/GI ppx   - Encourage Incentive spirometer   - Continue care per primary team    - D/w attending   
Pt is a 56y Female admitted s/p left robotic assisted, harvest of buccal mucosa graft, ureteroplasty, POD#2.     PLAN:  -No further ENT intervention at this time  -C/w Chlorhexidine S&S TID x 5 days total post-op  -C/w soft diet (w/o red dye/citrus)  -Pain control prn  -Remainder of care as per primary team  -Discussed with attending

## 2022-11-11 NOTE — DISCHARGE NOTE NURSING/CASE MANAGEMENT/SOCIAL WORK - PATIENT PORTAL LINK FT
You can access the FollowMyHealth Patient Portal offered by [facility] by registering at the following website: [email or web address]. By joining [employer]’s FollowMyHealth portal, you will also be able to view your health information using other applications (apps) compatible with our system.

## 2022-11-11 NOTE — DISCHARGE NOTE PROVIDER - NSDCFUADDINST_GEN_ALL_CORE_FT
Please return to the ED if you develop fevers of 100.3F or greater, intractable pain, intractable vomiting.  Recommend walk 2-3x/day for 30 mins  Continue with incentive spirometry  Continue to use prescribed pain medications as needed  Continue to use laxatives/stool softeners as needed

## 2022-11-17 ENCOUNTER — APPOINTMENT (OUTPATIENT)
Dept: UROLOGY | Facility: CLINIC | Age: 57
End: 2022-11-17

## 2022-11-17 VITALS
HEART RATE: 77 BPM | HEIGHT: 64 IN | DIASTOLIC BLOOD PRESSURE: 88 MMHG | OXYGEN SATURATION: 92 % | SYSTOLIC BLOOD PRESSURE: 147 MMHG | TEMPERATURE: 97.4 F | BODY MASS INDEX: 26.12 KG/M2 | WEIGHT: 153 LBS

## 2022-11-17 PROCEDURE — 99024 POSTOP FOLLOW-UP VISIT: CPT

## 2022-11-18 ENCOUNTER — APPOINTMENT (OUTPATIENT)
Dept: UROLOGY | Facility: CLINIC | Age: 57
End: 2022-11-18
Payer: COMMERCIAL

## 2022-11-18 DIAGNOSIS — N20.1 CALCULUS OF URETER: ICD-10-CM

## 2022-11-18 LAB — CREAT FLD-MCNC: 0.82 MG/DL

## 2022-11-18 PROCEDURE — 99024 POSTOP FOLLOW-UP VISIT: CPT

## 2022-11-22 ENCOUNTER — LABORATORY RESULT (OUTPATIENT)
Age: 57
End: 2022-11-22

## 2022-11-23 ENCOUNTER — OUTPATIENT (OUTPATIENT)
Dept: OUTPATIENT SERVICES | Facility: HOSPITAL | Age: 57
LOS: 1 days | Discharge: HOME | End: 2022-11-23

## 2022-11-23 ENCOUNTER — APPOINTMENT (OUTPATIENT)
Dept: OTOLARYNGOLOGY | Facility: CLINIC | Age: 57
End: 2022-11-23

## 2022-11-23 ENCOUNTER — RESULT REVIEW (OUTPATIENT)
Age: 57
End: 2022-11-23

## 2022-11-23 DIAGNOSIS — Z98.890 OTHER SPECIFIED POSTPROCEDURAL STATES: Chronic | ICD-10-CM

## 2022-11-23 DIAGNOSIS — Z96.0 PRESENCE OF UROGENITAL IMPLANTS: Chronic | ICD-10-CM

## 2022-11-23 DIAGNOSIS — J38.1 POLYP OF VOCAL CORD AND LARYNX: Chronic | ICD-10-CM

## 2022-11-23 DIAGNOSIS — Z46.6 ENCOUNTER FOR FITTING AND ADJUSTMENT OF URINARY DEVICE: ICD-10-CM

## 2022-11-23 PROCEDURE — 50389 REMOVE RENAL TUBE W/FLUORO: CPT | Mod: LT

## 2022-12-01 ENCOUNTER — APPOINTMENT (OUTPATIENT)
Dept: OTOLARYNGOLOGY | Facility: CLINIC | Age: 57
End: 2022-12-01

## 2022-12-01 PROCEDURE — 99024 POSTOP FOLLOW-UP VISIT: CPT

## 2022-12-01 NOTE — ASSESSMENT
[FreeTextEntry1] : S/p left buccal mucosa harvest for ureteroplasty\par Healing well, no issues\par RV prn

## 2022-12-01 NOTE — PHYSICAL EXAM
[Midline] : trachea located in midline position [de-identified] : left buccal defect granulating and mucosalizing appropriately [Normal] : no rashes

## 2022-12-01 NOTE — HISTORY OF PRESENT ILLNESS
[FreeTextEntry1] : Patient returns today following up on s/p left buccal mucosa harvest 11/19/22.  She is  denies any pain . No issues. Home

## 2022-12-20 ENCOUNTER — APPOINTMENT (OUTPATIENT)
Dept: UROLOGY | Facility: CLINIC | Age: 57
End: 2022-12-20

## 2022-12-20 ENCOUNTER — NON-APPOINTMENT (OUTPATIENT)
Age: 57
End: 2022-12-20

## 2022-12-20 DIAGNOSIS — Z00.00 ENCOUNTER FOR GENERAL ADULT MEDICAL EXAMINATION W/OUT ABNORMAL FINDINGS: ICD-10-CM

## 2022-12-20 LAB
BILIRUB UR QL STRIP: NORMAL
COLLECTION METHOD: NORMAL
GLUCOSE UR-MCNC: NORMAL
HCG UR QL: 0.2 EU/DL
HGB UR QL STRIP.AUTO: NORMAL
KETONES UR-MCNC: NORMAL
LEUKOCYTE ESTERASE UR QL STRIP: NORMAL
NITRITE UR QL STRIP: NORMAL
PH UR STRIP: 7
PROT UR STRIP-MCNC: 100
SP GR UR STRIP: 1.01

## 2022-12-29 ENCOUNTER — APPOINTMENT (OUTPATIENT)
Dept: UROLOGY | Facility: CLINIC | Age: 57
End: 2022-12-29

## 2022-12-29 PROCEDURE — 52310 CYSTOSCOPY AND TREATMENT: CPT | Mod: 79

## 2023-01-01 NOTE — DISCHARGE NOTE NURSING/CASE MANAGEMENT/SOCIAL WORK - NSDCPEFALRISK_GEN_ALL_CORE
9
For information on Fall & Injury Prevention, visit: [email or web address] OR  [email or web address] [place]  https://www.cdc.gov/steadi/patient.html

## 2023-01-10 PROBLEM — N20.1 LEFT URETERAL STONE: Status: ACTIVE | Noted: 2020-12-10

## 2023-02-06 NOTE — ASU PATIENT PROFILE, ADULT - NS PREOP MEDICATION GIVEN
Patient now presents for fistulagram and possible intervention. The risks and benefits of this procedure were discussed. The patient voiced understanding. All questions were answered and the patient opted to proceed. Patient understands that if the fistula is not functional we will proceed with a catheter insertion. History and Physical    Nohemi Jauregui Sr is a 70 y.o. male with PMH significant for ESRD on HD via LUE AVF, seizures, HTN, Sickle cell anemia. AICD/pacemaker on the right. he presents today for difficult fistula cannulation. The patient is seen in the presence of his wife. He states that he was initiated on dialysis about 10 days ago. He dialyzes [facility] at the [place] in [place]. His nephrologist is Dr. Raegan Villanueva. Patient has a h/o R AVG which has thrombosed. He states it [employer]" but he describes episodes of coldness of his right hand, sounding consistent with possible steal syndrome. His left brachiobasilic AV fistula was created in 2018 by Dr. Yu Alexandre in anticipation of requiring dialysis. In 2020 he underwent a fistulagram which showed a mid arm basilic vein stenosis which was treated with 7x40mm balloon angioplasty. This is the access now being used for dialysis. Patient states that they're having difficulty cannulating the fistula. In fact, only one technician can successfully cannulate him. Despite this challenge, he states that he has had successful dialysis over the past five treatments.                  Past Medical History:   Diagnosis Date    Chronic kidney disease      Hypertension      Sickle cell anemia ([address]              Past Surgical History:   Procedure Laterality Date    HX ORTHOPAEDIC        MT INTRO CATH DIALYSIS CIRCUIT DX ANGRPH FLUOR S&I Left 4/15/2020     FISTULOGRAM LEFT performed by Yasmany Ledbetter MD at [facility] CATH LAB    MT INTRO CATH DIALYSIS CIRCUIT W/TRLUML BALO ANGIOP N/A 4/15/2020     Angioplasty Fistula/Dialysis Circuit performed by Amy Seymour MD at [facility]           Patient Active Problem List   Diagnosis Code    Hyperkalemia E87.5    CKD (chronic kidney disease) N18.9    Seizure-like activity ([facility] R56.9    Seizure ([facility] R56.9    ESRD (end stage renal disease) on dialysis ([facility] N18.6, Z99.2             Current Outpatient Medications   Medication Sig Dispense Refill    ferrous sulfate 325 mg (65 mg iron) tablet Take 325 mg by mouth two (2) times a day. carvediloL (COREG) 6.25 mg tablet Take 1 Tab by mouth two (2) times daily (with meals). 60 Tab 0    amLODIPine (NORVASC) 5 mg tablet Take 1 Tab by mouth daily. Indications: high blood pressure 30 Tab 0    furosemide (LASIX) 40 mg tablet Take 2 Tabs by mouth daily. 30 Tab 0    albuterol (PROVENTIL HFA, VENTOLIN HFA, PROAIR HFA) 90 mcg/actuation inhaler Take 1 Puff by inhalation every six (6) hours as needed for Shortness of Breath. ascorbic acid, vitamin C, (VITAMIN C) 500 mg tablet Take 500 mg by mouth daily. calcium acetate,phosphat bind, (PHOSLO) 667 mg cap Take 1 Cap by mouth three (3) times daily (with meals). pantoprazole (Protonix) 40 mg tablet Take 40 mg by mouth daily. traZODone (DESYREL) 50 mg tablet Take 50 mg by mouth nightly.        sodium bicarbonate 650 mg tablet Take 1 Tab by mouth three (3) times daily. Indications: excess body acid 90 Tab 0    calcium carbonate (OS-CORINA) 500 mg calcium (1,250 mg) tablet Take 1 Tab by mouth nightly.  30 Tab 0           Allergies   Allergen Reactions    Aspirin Other (comments)    Quinine Other (comments)    Sulfa (Sulfonamide Antibiotics) Other (comments)    Tylenol [Acetaminophen] Not Reported This Time      Social History            Socioeconomic History    Marital status:        Spouse name: Not on file    Number of children: Not on file    Years of education: Not on file    Highest education level: Not on file   Occupational History    Not on file   Tobacco Use    Smoking status: Every Day    Smokeless tobacco: Current   Substance and Sexual Activity    Alcohol use: No    Drug use: Yes       Types: Marijuana    Sexual activity: Not on file   Other Topics Concern    Not on file   Social History Narrative    Not on file      Social Determinants of Health      Financial Resource Strain: Not on file   Food Insecurity: Not on file   Transportation Needs: Not on file   Physical Activity: Not on file   Stress: Not on file   Social Connections: Not on file   Intimate Partner Violence: Not on file   Housing Stability: Not on file      No family history on file. Physical Exam:    Visit Vitals  Pulse 89   Ht 6' (1.829 m)   Wt 136 lb (61.7 kg)   SpO2 98%   BMI 18.44 kg/m²         Constitutional:  Patient is well developed, well nourished, and not distressed. HEENT: atraumatic, normocephalic, wearing a mask. Eyes:   Cunjunctivae clear, no scleral icterus  Cardiovascular:  Normal rate, regular rhythm  Pulses:    Left:       Radial 2+      Left brachiobasilic AVF with thrill and bruit, increased pulsatility in the mid-segment. Evidence of infiltration overlying the bicep muscle. Pulmonary/Chest: Effort normal    Extremities: Normal range of motion. No edema. Digits no cyanosis or clubbing  Neurological:  he  is alert and oriented x3 . Gait normal. Motor & sensory grossly intact in all 4 limbs. Psych: Appropriate mood and affect. Skin:  Skin is warm and dry. No rash noted. No erythema. No ulcers. Impression and Plan:  Daniel Madrigal is a 70 y.o. male with ESRD on HD, now with difficult cannulation of his left brachiobasilic AVF. Complication of dialysis access. Difficult cannulation and physical exam findings consistent with possible recurrence of mid arm basilic vein stenosis. Per the patient, he is currently able to get dialyzed and does not appear to require urgent placement of a tunneled catheter. - Will schedule him for fistulagram and possible intervention on 2/6/2022. n/a The risks and benefits of this procedure were discussed. The patient voiced understanding. All questions were answered and the patient opted to proceed. - also discussed that if I am unable to get the fistula to work, will place a tunneled dialysis catheter. - recommended warm compresses to the area of infiltration to help with resolution of ecchymosis        We reviewed the plan with the patient and his wife and they both express understanding.            Gaurang Bosch MD

## 2023-03-14 ENCOUNTER — APPOINTMENT (OUTPATIENT)
Dept: UROLOGY | Facility: CLINIC | Age: 58
End: 2023-03-14
Payer: COMMERCIAL

## 2023-03-14 VITALS
SYSTOLIC BLOOD PRESSURE: 138 MMHG | HEART RATE: 75 BPM | TEMPERATURE: 97.3 F | RESPIRATION RATE: 16 BRPM | WEIGHT: 153 LBS | HEIGHT: 64 IN | DIASTOLIC BLOOD PRESSURE: 78 MMHG | BODY MASS INDEX: 26.12 KG/M2 | OXYGEN SATURATION: 98 %

## 2023-03-14 PROCEDURE — 99214 OFFICE O/P EST MOD 30 MIN: CPT

## 2023-03-14 NOTE — HISTORY OF PRESENT ILLNESS
[FreeTextEntry1] : BENEDICT PARRA is a 57 year old female with a Hx of SLE on cellcept, Hx of PCNL at [facility] in 2020 with subsequent hemorrhage requiring transfusion, Hx of Lt ureteroscopy laser lithotripsy with stent insertion 1/2021, at that time found to have a dense fibrosis around impacted stone with subsequent RPG demonstrating midureteral stricture sp double stent insertion 3/2022 and persistent stricture on repeat RPG 4/2022, single stent replaced. \par sp L ureteroscopy, laser endopyelotomy with balloon dilation, ureteral stent exchange, RPG. stricture starts at top of sacrum at start of false pelvis down 2 -2.5 cm. non obliterative s/p left robotic buccal graft ureteroplasty 11/9/22\par \par No flank pain no hematuria doing well\par \par RBUS 02/2023 images visualized  -  interval resolution of hydronephrosis, midpole cyst left kidney slightly larger . Multiple right renal cysts as outlined above , similar to previously \par \par NM renogram 02/2023 images visualized - Diminished left renal perfusion and borderline function in the left kidney without evidence of obstruction. Compared to the prior study , the left renal excretion is improved. Normal right renal perfusion and function without evidence of obstruction \par \par BMP 11/2022 - creatinine 0.8// GFR 86\par \par previously \par RBUS 08/16/22 images - \par Moderate left hydroureteronephrosis , slightly increased from 05/31/22 . The stent that was present on 05/31/22 is no longer identified . Benign bilateral renal cysts .Unremarkable sonographic appearance of the urinary blader. No significant PVR noted and no stones present \par \par NM renogram with diuretic images visualized 08/16/22 -Diminished left renal perfusion in the left kidney with evidence of obstruction. Normal perfusion and function in the right kidney without evidence of obstruction. split renal function - 23 % left , right 57 % \par \par Patient's ureteral stone was first detected after surveillance imaging after PCNL and was found to have silent obstruction from a Lt ureteral stone and Dr. Robison performed a Ureteroscopy Carmelo Lithotripsy 1/2021, there was extensive fibrosis around the impacted stone. \par Pt has a stent in place and she is doing well. Reports no flank pain BL. \par Denies gross hematuria, dysuria or associated symptoms. \par Pt reports Hx of SLE and she is on CellCept. States she stops the CellCept periprocedure. \par \par  PMH: PCNL at [facility] in 2020, Ureteroscopy laser lithotripsy with ureteral stent insertion. \par Family History: No  malignancies\par \par CT a/p w/o contrast images visualized from 1/2022: there is a severe Lt sided hydroureteronephrosis secondary to a 5 mm calculus in the mid Lt ureter, unchanged in position from the prior exam. The degree of hydronephrosis is worse compared to the prior exam. Unchanged capsular calcification in the interpolar region of the Lt kidney. Stable Lt renal cyst. there is interval decrease in stone burden on the Rt kidney with 4 mm calcification remaining in the upper pole. \par \par 4/2022: FLuoroscopy retrograde pyelogram images visualized: a proximate 1-1.5 cm Lt ureteral stricture is seen, unable to assess distal to the stricture. there is Lt hydronephrosis. \par \par Discussed with Dr. Robison: upon reinspection 3/2022 - there was a likely short stricture, 1 cm, did not undergo dilation or endopyelotomy, but did have 2 stents placed. single stent was exchanged 4/22/2022, as it appeared the stricture was present. \par

## 2023-03-14 NOTE — PHYSICAL EXAM
[General Appearance - Well Developed] : well developed [General Appearance - Well Nourished] : well nourished [Normal Appearance] : normal appearance [Well Groomed] : well groomed [General Appearance - In No Acute Distress] : no acute distress [Abdomen Soft] : soft [Abdomen Tenderness] : non-tender [Abdomen Hernia] : no hernia was discovered [Costovertebral Angle Tenderness] : no ~M costovertebral angle tenderness [Urinary Bladder Findings] : the bladder was normal on palpation [Edema] : no peripheral edema [] : no respiratory distress [Respiration, Rhythm And Depth] : normal respiratory rhythm and effort [Exaggerated Use Of Accessory Muscles For Inspiration] : no accessory muscle use [Oriented To Time, Place, And Person] : oriented to person, place, and time [Affect] : the affect was normal [Mood] : the mood was normal [Not Anxious] : not anxious [Normal Station and Gait] : the gait and station were normal for the patient's age [No Focal Deficits] : no focal deficits [No Palpable Adenopathy] : no palpable adenopathy [FreeTextEntry1] : well healed incisions

## 2023-03-14 NOTE — ASSESSMENT
[FreeTextEntry1] : BENEDICT PARRA is a 57 year old female with a Hx of SLE on cellcept, Hx of PCNL at [facility] in 2020 with subsequent hemorrhage requiring transfusion, Hx of Lt ureteroscopy laser lithotripsy with stent insertion 1/2021, at that time found to have a dense fibrosis around impacted stone with subsequent RPG demonstrating midureteral stricture sp double stent insertion 3/2022 and persistent stricture on repeat RPG 4/2022, single stent replaced. \par S/P L ureteroscopy, laser endopyelotomy with balloon dilation, ureteral stent exchange, RPG. stricture starts at top of sacrum at start of false pelvis down 2 -2.5 cm. non obliterative .s/p left robotic buccal graft ureteroplasty 11/9/22- stable.\par Resolution of hydronephrosis postop\par \par \par The patient would like to establish stone prevention care . She will continue taking  as per her prior urologist 1 K citrate tablets bid  , and Allopurinol 100 mg until the results of her prior and  updated Litholink for further results \par repeat Renal US ~ 09/2023 \par \par Stone nutritional counseling given--First and foremost, the most important recommendation is to increase water intake. I have recommended that she drink enough water to produce 2.5L of urine per day. More easily put, I have recommended the patient consume enough water to keep Her urine clear. I have also recommended adding crystal light (or lemon) which contains citrate which prevents stone formation. I have also stressed the importance of minimizing salt and animal protein in the diet. \par

## 2023-06-08 NOTE — ED ADULT NURSE NOTE - CHPI ED NUR SYMPTOMS POS
Q07 00  [facility]  coding opportunities          Chart Reviewed number of suggestions sent to Provider: 1     Patients Insurance     Medicare Insurance: [employer]
CHEST PAIN/stabbing pain

## 2023-06-13 ENCOUNTER — APPOINTMENT (OUTPATIENT)
Dept: UROLOGY | Facility: CLINIC | Age: 58
End: 2023-06-13
Payer: COMMERCIAL

## 2023-06-13 VITALS
RESPIRATION RATE: 16 BRPM | SYSTOLIC BLOOD PRESSURE: 162 MMHG | DIASTOLIC BLOOD PRESSURE: 102 MMHG | HEIGHT: 64 IN | HEART RATE: 74 BPM | TEMPERATURE: 97.2 F | BODY MASS INDEX: 26.12 KG/M2 | OXYGEN SATURATION: 98 % | WEIGHT: 153 LBS

## 2023-06-13 DIAGNOSIS — Z87.442 PERSONAL HISTORY OF URINARY CALCULI: ICD-10-CM

## 2023-06-13 PROCEDURE — 99214 OFFICE O/P EST MOD 30 MIN: CPT

## 2023-06-13 RX ORDER — CEFPODOXIME PROXETIL 200 MG/1
200 TABLET, FILM COATED ORAL
Qty: 14 | Refills: 0 | Status: COMPLETED | COMMUNITY
Start: 2022-05-27 | End: 2023-06-13

## 2023-06-13 RX ORDER — SULFAMETHOXAZOLE AND TRIMETHOPRIM 800; 160 MG/1; MG/1
800-160 TABLET ORAL
Qty: 14 | Refills: 0 | Status: COMPLETED | COMMUNITY
Start: 2022-04-07 | End: 2023-06-13

## 2023-06-13 NOTE — PHYSICAL EXAM
[General Appearance - Well Nourished] : well nourished [General Appearance - Well Developed] : well developed [Normal Appearance] : normal appearance [Well Groomed] : well groomed [General Appearance - In No Acute Distress] : no acute distress [Abdomen Soft] : soft [Abdomen Tenderness] : non-tender [Abdomen Hernia] : no hernia was discovered [Costovertebral Angle Tenderness] : no ~M costovertebral angle tenderness [Edema] : no peripheral edema [] : no respiratory distress [Respiration, Rhythm And Depth] : normal respiratory rhythm and effort [Exaggerated Use Of Accessory Muscles For Inspiration] : no accessory muscle use [Oriented To Time, Place, And Person] : oriented to person, place, and time [Affect] : the affect was normal [Mood] : the mood was normal [Not Anxious] : not anxious [Normal Station and Gait] : the gait and station were normal for the patient's age [No Focal Deficits] : no focal deficits [FreeTextEntry1] : Surgical scars well-healed

## 2023-06-13 NOTE — ASSESSMENT
[FreeTextEntry1] : EBNEDICT PARRA is a 57 year old female with a Hx of SLE on cellcept, Hx of PCNL at [facility] in 2020 with subsequent hemorrhage requiring transfusion, Hx of Lt ureteroscopy laser lithotripsy with stent insertion 1/2021, at that time found to have a dense fibrosis around impacted stone with subsequent RPG demonstrating midureteral stricture sp double stent insertion 3/2022 and persistent stricture on repeat RPG 4/2022, single stent replaced. \par S/P L ureteroscopy, laser endopyelotomy with balloon dilation, ureteral stent exchange, RPG. stricture starts at top of sacrum at start of false pelvis down 2 -2.5 cm. non obliterative .s/p left robotic buccal graft ureteroplasty 11/9/22- stable. Resolution of hydronephrosis postop.  Metabolic work-up demonstrates good response with potassium citrate and allopurinol.\par \par Plan:\par -Follow-up September renal/bladder ultrasound\par -cont k cit and allopurinol\par \par Stone nutritional counseling given--First and foremost, the most important recommendation is to increase water intake. I have recommended that she drink enough water to produce 2.5L of urine per day. More easily put, I have recommended the patient consume enough water to keep Her urine clear. I have also recommended adding crystal light (or lemon) which contains citrate which prevents stone formation. I have also stressed the importance of minimizing salt and animal protein in the diet. \par

## 2023-06-13 NOTE — HISTORY OF PRESENT ILLNESS
[FreeTextEntry1] : BENEDICT PARRA is a 57 year old female with a Hx of SLE on cellcept, Hx of PCNL at [facility] in 2020 with subsequent hemorrhage requiring transfusion, Hx of Lt ureteroscopy laser lithotripsy with stent insertion 1/2021, at that time found to have a dense fibrosis around impacted stone with subsequent RPG demonstrating midureteral stricture sp double stent insertion 3/2022 and persistent stricture on repeat RPG 4/2022, single stent replaced. \par sp L ureteroscopy, laser endopyelotomy with balloon dilation, ureteral stent exchange, RPG. stricture starts at top of sacrum at start of false pelvis down 2 -2.5 cm. non obliterative s/p left robotic buccal graft ureteroplasty 11/9/22\par \par Overall doing well denies any issues at this time. no flank pain or hematuria\par \par Metabolic work-up, from 5/23/2020, demonstrates good urine volume 2.26 L with urine citrate at 532.  Urine pH 6.765 and improved.  Currently on potassiums citrate 10 mEq twice daily, as well as allopurinol.\par \par Previously:\par RBUS 02/2023 images -  interval resolution of hydronephrosis, midpole cyst left kidney slightly larger . Multiple right renal cysts as outlined above , similar to previously \par \par NM renogram 02/2023 images - Diminished left renal perfusion and borderline function in the left kidney without evidence of obstruction. Compared to the prior study , the left renal excretion is improved. Normal right renal perfusion and function without evidence of obstruction \par \par BMP 11/2022 - creatinine 0.8// GFR 86\par \par previously \par RBUS 08/16/22 images - \par Moderate left hydroureteronephrosis , slightly increased from 05/31/22 . The stent that was present on 05/31/22 is no longer identified . Benign bilateral renal cysts .Unremarkable sonographic appearance of the urinary blader. No significant PVR noted and no stones present \par \par NM renogram with diuretic images visualized 08/16/22 -Diminished left renal perfusion in the left kidney with evidence of obstruction. Normal perfusion and function in the right kidney without evidence of obstruction. split renal function - 23 % left , right 57 % \par \par Patient's ureteral stone was first detected after surveillance imaging after PCNL and was found to have silent obstruction from a Lt ureteral stone and Dr. Robison performed a Ureteroscopy Carmelo Lithotripsy 1/2021, there was extensive fibrosis around the impacted stone. \par Pt has a stent in place and she is doing well. Reports no flank pain BL. \par Denies gross hematuria, dysuria or associated symptoms. \par Pt reports Hx of SLE and she is on CellCept. States she stops the CellCept periprocedure. \par \par  PMH: PCNL at [facility] in 2020, Ureteroscopy laser lithotripsy with ureteral stent insertion. \par Family History: No  malignancies\par \par CT a/p w/o contrast images visualized from 1/2022: there is a severe Lt sided hydroureteronephrosis secondary to a 5 mm calculus in the mid Lt ureter, unchanged in position from the prior exam. The degree of hydronephrosis is worse compared to the prior exam. Unchanged capsular calcification in the interpolar region of the Lt kidney. Stable Lt renal cyst. there is interval decrease in stone burden on the Rt kidney with 4 mm calcification remaining in the upper pole. \par \par 4/2022: FLuoroscopy retrograde pyelogram images visualized: a proximate 1-1.5 cm Lt ureteral stricture is seen, unable to assess distal to the stricture. there is Lt hydronephrosis. \par \par Discussed with Dr. Robison: upon reinspection 3/2022 - there was a likely short stricture, 1 cm, did not undergo dilation or endopyelotomy, but did have 2 stents placed. single stent was exchanged 4/22/2022, as it appeared the stricture was present. \par

## 2023-09-08 NOTE — ASU PATIENT PROFILE, ADULT - PATIENT'S GENDER IDENTITY
Received cardiac clearance request from Dr. Yousuf Riojas for wide excision melanoma right mid back with sentinel lymph node biopsy and wide excision melanoma upper back reconstruction of the upper and lower back open wound with local flap, possible skin graft, possible biopsy/excision of the right anterior shoulder.  Per Dr. Iyer pt is cleared as a moderate risk to proceed with procedure.  Clearance letter created and all requested documents  faxed/sent via Epic to fax number provided on request form.  All paperwork sent to be scanned into Epic.   
Female

## 2023-09-13 NOTE — H&P PST ADULT - BP NONINVASIVE DIASTOLIC (MM HG)
88 Trilobed Flap Text: The defect edges were debeveled with a #15 scalpel blade.  Given the location of the defect and the proximity to free margins a trilobed flap was deemed most appropriate.  Using a sterile surgical marker, an appropriate trilobed flap drawn around the defect.    The area thus outlined was incised deep to adipose tissue with a #15 scalpel blade.  The skin margins were undermined to an appropriate distance in all directions utilizing iris scissors.

## 2023-09-22 ENCOUNTER — NON-APPOINTMENT (OUTPATIENT)
Age: 58
End: 2023-09-22

## 2023-10-16 ENCOUNTER — APPOINTMENT (OUTPATIENT)
Dept: UROLOGY | Facility: CLINIC | Age: 58
End: 2023-10-16
Payer: COMMERCIAL

## 2023-10-16 DIAGNOSIS — N13.5 CROSSING VESSEL AND STRICTURE OF URETER W/OUT HYDRONEPHROSIS: ICD-10-CM

## 2023-10-16 DIAGNOSIS — N20.0 CALCULUS OF KIDNEY: ICD-10-CM

## 2023-10-16 DIAGNOSIS — N13.30 UNSPECIFIED HYDRONEPHROSIS: ICD-10-CM

## 2023-10-16 DIAGNOSIS — R82.991 HYPOCITRATURIA: ICD-10-CM

## 2023-10-16 PROCEDURE — 99214 OFFICE O/P EST MOD 30 MIN: CPT

## 2024-02-06 NOTE — H&P PST ADULT - NS MD HP HEP C STATUS
[EKG obtained to assist in diagnosis and management of assessed problem(s)] : EKG obtained to assist in diagnosis and management of assessed problem(s) Negative

## 2024-02-13 NOTE — H&P PST ADULT - NSANTHGENDERRD_ENT_A_CORE
Detail Level: Simple Initiate Treatment: Dr. Messina+ Cicapair Tiger Grass Color Correcting Cream with SPF 30 daily No

## 2024-04-11 NOTE — H&P PST ADULT - PSYCHIATRIC
No care due was identified.  Health [facility] Embedded Care Due Messages. Reference number: [id number].   4/11/2024 12:11:01 AM CDT   detailed exam

## 2024-06-06 NOTE — H&P PST ADULT - NSICDXFAMILYHX_GEN_ALL_CORE_FT
no chills/no diaphoresis
FAMILY HISTORY:  FH: breast cancer, mother    Mother  Still living? No  Family history of thyroid cancer, Age at diagnosis: Age Unknown  Family history of type 2 diabetes mellitus in mother, Age at diagnosis: Age Unknown  FH: HTN (hypertension), Age at diagnosis: Age Unknown

## 2024-07-01 ENCOUNTER — RX RENEWAL (OUTPATIENT)
Age: 59
End: 2024-07-01

## 2024-10-21 ENCOUNTER — NON-APPOINTMENT (OUTPATIENT)
Age: 59
End: 2024-10-21

## 2024-10-21 ENCOUNTER — APPOINTMENT (OUTPATIENT)
Dept: UROLOGY | Facility: CLINIC | Age: 59
End: 2024-10-21
Payer: COMMERCIAL

## 2024-10-21 VITALS
OXYGEN SATURATION: 97 % | SYSTOLIC BLOOD PRESSURE: 135 MMHG | WEIGHT: 153 LBS | HEIGHT: 64 IN | DIASTOLIC BLOOD PRESSURE: 83 MMHG | BODY MASS INDEX: 26.12 KG/M2 | RESPIRATION RATE: 18 BRPM | TEMPERATURE: 97.2 F | HEART RATE: 79 BPM

## 2024-10-21 DIAGNOSIS — R82.991 HYPOCITRATURIA: ICD-10-CM

## 2024-10-21 DIAGNOSIS — N20.0 CALCULUS OF KIDNEY: ICD-10-CM

## 2024-10-21 LAB
BILIRUB UR QL STRIP: NORMAL
COLLECTION METHOD: NORMAL
GLUCOSE UR-MCNC: NORMAL
HCG UR QL: 0.2 EU/DL
HGB UR QL STRIP.AUTO: NORMAL
KETONES UR-MCNC: NORMAL
LEUKOCYTE ESTERASE UR QL STRIP: NORMAL
NITRITE UR QL STRIP: NORMAL
PH UR STRIP: 6.5
PROT UR STRIP-MCNC: NORMAL
SP GR UR STRIP: <=1.005

## 2024-10-21 PROCEDURE — G2211 COMPLEX E/M VISIT ADD ON: CPT | Mod: NC

## 2024-10-21 PROCEDURE — 99214 OFFICE O/P EST MOD 30 MIN: CPT | Mod: 25

## 2024-10-21 PROCEDURE — 81003 URINALYSIS AUTO W/O SCOPE: CPT | Mod: QW

## 2025-01-17 NOTE — DISCHARGE NOTE NURSING/CASE MANAGEMENT/SOCIAL WORK - NSDCFUADDAPPT_GEN_ALL_CORE_FT
[FreeTextEntry1] : Add Metformin 500 mg BID Was Intol to [facility] w/ wt loss Recc U/A and C/S Recc inc po fluids Low CHO diet Recc Optho eval Labs and A1C No additional  change w/tx - renew Actos 45 mg daily All questions answered Re check 3 months  Please report to your scheduled f/u appointment

## 2025-03-09 NOTE — BRIEF OPERATIVE NOTE - NSICDXBRIEFPROCEDURE_GEN_ALL_CORE_FT
Refill Routing Note   Medication(s) are not appropriate for processing by [facility] for the following reason(s):      Patient not seen by provider within 15 months    [facility] action(s):  Defer Care Due:  None identified            Appointments  past 12m or future 3m with PCP    Date Provider   Last Visit   9/26/2023 Junito Ortiz MD   Next Visit   Visit date not found Junito Ortiz MD   ED visits in past 90 days: 0        Note composed:2:05 PM 03/09/2025          
PROCEDURES:  Cystourethroscopy, with diagnostic ureteroscopy and ureteral stent insertion 13-Apr-2022 11:17:39  Phan Robison  Cystoscopy, with retrograde pyelography 13-Apr-2022 11:18:09  Phan Robison

## 2025-04-23 NOTE — PRE-ANESTHESIA EVALUATION ADULT - MALLAMPATI CLASS
dynamic standing with support at her thighs lifting her in the air as she kept her trunk upright for about 10 seconds x 2   Balance/Coordination - functionally throughout   Gait/Stairs -  Overground gait training using the Posterior Maribel Walker for about 100' x 1  - see below  - walk about 6' x 3 with trunk wrap around the front of her    Strengthening Activities - functionally throughout  - with dynamic trunk activities below  - sit on small blue therapy ball- lean backward over it then sit up and roll forward toward her feet as well as some side to side shifting on the ball   Estim ---   Bike - bike outside around small loop and back x 1   Other ---       Activity Repetitions Comments   [] Supine to Sit    - x 2 each direction [] By Mid Trunk  [] By Pelvis  [] By One Arm/45 Degrees  [] By Pelvis Facing Away  [] By Legs Around Trunk  [] 180 Degrees   [] Sitting On Forearm with    [] Intermittent Support  [] Forearm Free   [x] Knees Against Chest- Straight Sit Up - x 5    [] Supine to Sit By Trunk Contained     [] Supine to Sit By Arm and Opposite Leg - x 3 each leg    [] Prone to 4 Point to Sit By Shoulders     [] Supine to Sit by Mid-Trunk, 1 Leg Bent         Activity Repetitions Comments   [] High Kneel to Stand By Thighs  [] Floor to Stand  [] Lower from Standing   [] High Kneel to Low Kneel by Thighs or Low Pelvis       [] Remote Low Kneel - x 5 rounds             Activity Repetitions Comments   [] 180 Degrees Standing         [x] Supine to Stand   - x 4 [] By Occiput and Ankles  [] By Forearms and Ankles  [x] By Trunk Wrap           [] Standing Through Half Kneeling by Forearms and Ankle - x 2 each leg [] Pronated Hold single arm  [] Supinated Hold     [] Prone to Four Point to Squat to Stand by Thighs - x 3    [x] Standing   - x mult reps throughout the session mainly by thighs [x] By Thighs  [] Below Knees  [] By Ankles  [] Combination   [] Standing 20 Counts Random       [] Prone to Stand     [] By Abdomen 
Class II - visualization of the soft palate, fauces, and uvula